# Patient Record
Sex: MALE | Race: WHITE | NOT HISPANIC OR LATINO | Employment: UNEMPLOYED | URBAN - METROPOLITAN AREA
[De-identification: names, ages, dates, MRNs, and addresses within clinical notes are randomized per-mention and may not be internally consistent; named-entity substitution may affect disease eponyms.]

---

## 2017-03-08 ENCOUNTER — HISTORICAL (OUTPATIENT)
Dept: ADMINISTRATIVE | Facility: HOSPITAL | Age: 57
End: 2017-03-08

## 2017-04-12 ENCOUNTER — HISTORICAL (OUTPATIENT)
Dept: ADMINISTRATIVE | Facility: HOSPITAL | Age: 57
End: 2017-04-12

## 2017-09-02 ENCOUNTER — HISTORICAL (OUTPATIENT)
Dept: ADMINISTRATIVE | Facility: HOSPITAL | Age: 57
End: 2017-09-02

## 2018-01-12 ENCOUNTER — HISTORICAL (OUTPATIENT)
Dept: ADMINISTRATIVE | Facility: HOSPITAL | Age: 58
End: 2018-01-12

## 2018-04-04 ENCOUNTER — HISTORICAL (OUTPATIENT)
Dept: INTERNAL MEDICINE | Facility: CLINIC | Age: 58
End: 2018-04-04

## 2020-05-01 ENCOUNTER — HISTORICAL (OUTPATIENT)
Dept: ADMINISTRATIVE | Facility: HOSPITAL | Age: 60
End: 2020-05-01

## 2021-06-09 ENCOUNTER — HISTORICAL (OUTPATIENT)
Dept: ENDOSCOPY | Facility: HOSPITAL | Age: 61
End: 2021-06-09

## 2021-06-09 LAB — CRC RECOMMENDATION EXT: NORMAL

## 2022-02-10 ENCOUNTER — HISTORICAL (OUTPATIENT)
Dept: ADMINISTRATIVE | Facility: HOSPITAL | Age: 62
End: 2022-02-10

## 2022-04-09 ENCOUNTER — HISTORICAL (OUTPATIENT)
Dept: ADMINISTRATIVE | Facility: HOSPITAL | Age: 62
End: 2022-04-09
Payer: MEDICARE

## 2022-04-26 VITALS
SYSTOLIC BLOOD PRESSURE: 125 MMHG | OXYGEN SATURATION: 99 % | HEIGHT: 66 IN | DIASTOLIC BLOOD PRESSURE: 82 MMHG | WEIGHT: 155.88 LBS | BODY MASS INDEX: 25.05 KG/M2

## 2022-04-30 NOTE — H&P
Patient:   Chad Richardson             MRN: [id number]            FIN: [id number]               Age:   61 years     Sex:  Male     :  1960   Associated Diagnoses:   None   Author:   Felix Lee MD A      Chief Complaint   Positive Cologuard test      History of Present Illness   61-year-old male presents for colonoscopy/diagnostic secondary to recent positive Cologuard test.  His last colonoscopy in  was normal.  Today, he describes good appetite tolerating his diet well denies abdominal pain change in bowel habit or unintentional weight loss.      Health Status   Allergies:    Allergies (1) Active Reaction  No Known Allergies None Documented     Current medications:  (Selected)   Inpatient Medications  Ordered  Buffered Lidocaine 1% - 1mL syringe: 0.5 mL, 5 mg =, form: Injection, ID, As Directed PRN for other (see comment), first dose 21 7:55:00 CDT, May inject 0.5mL at IV site, if not allergic  Plasmalyte 1,000 mL: 1,000 mL, 1,000 mL, IV, 20 mL/hr, start date 21 7:55:00 CDT, 1.8, m2  Plasmalyte 1,000 mL: 1,000 mL, 1,000 mL, IV, 20 mL/hr, start date 21 7:55:00 CDT, 1.8, m2  Zofran: 4 mg, form: Injection, IV Push, Once-Unscheduled PRN for nausea, first dose 21 7:55:00 CDT  Prescriptions  Prescribed  Genvoya oral tablet: 1 tab(s), Oral, Daily, with food, # 30 tab(s), 4 Refill(s), Pharmacy: [facility], 168, cm, Height/Length Dosing, 21 13:22:00 CDT, 70.4, kg, Weight Dosing, 21 13:22:00 CDT  atorvastatin 20 mg oral tablet: 20 mg = 1 tab(s), Oral, Daily, # 90 tab(s), 1 Refill(s), Pharmacy: [facility], 168, cm, Height/Length Dosing, 20 8:43:00 CST, 59.35, kg, Weight Dosing, 20 8:43:00 CST  Documented Medications  Documented  Suprep Bowel Prep Kit oral liquid:       Histories   Past Medical History:    Resolved  Tobacco user(  Probable Diagnosis  ) ([id number]):  Resolved on 2015 at 55 years.  Tobacco user  ([id number]):  Resolved on 5/2/2017 at 57 years.  HIV ([id number]):  Resolved on 7/12/2017 at 57 years.   Family History:    Heart disease  Mother  Diabetes mellitus type 2  Mother  Brother  Sister  Hypertension.  Sister     Procedure history:    R femur fx in 1980 at 20 Years.  Leg prosthesis ([id number]).  Comments:  2/12/2015 16:36 CST - Contributor_system, [email or web address]  08/04/2014 10:35:14 - Joi Farias: right lefg  Spleen ([id number]).  Comments:  2/12/2015 16:36 CST - Contributor_system, [email or web address]  08/04/2014 10:36:10 - Joi Farias: sleenectomy   Social History        Social & Psychosocial Habits    Alcohol  12/28/2018  Use: Current    Type: Liquor    Frequency: Daily    Started at age: 14 Years    Has alcohol use interfered with work or home life? No    Do you ever drink more than intended? No    Has anyone been hurt or at risk by your drinking? No    Concerns about alcohol use in household: No    Comment: States his drinking has increased in the last 3 months to at least a 12 pack per day - 12/28/2018 07:24 - Ruthy Esquivel RN    Employment/School  10/02/2015  Status: Unemployed    Home/Environment  10/02/2015  Lives with: Siblings    Alcohol abuse in household: No    Substance abuse in household: No    Smoker in household: Yes    Feels unsafe at home: No    Safe place to go: Yes    Family/Friends available to help: Yes    Nutrition/Health  08/29/2019  Home Diet Regular    Appetite Fair    Sexual  10/02/2015  Sexually active: No    Substance Use  12/28/2018  Use: Past    Type: Cocaine, Heroin, Marijuana    Started at age: 11 Years    IV drug use: No    Tobacco  05/17/2021  Use: 10 or more cigarettes (1/    Patient Wants Consult For Cessation Counseling No    06/08/2021  Use: 10 or more cigarettes (1/    Patient Wants Consult For Cessation Counseling No    06/09/2021  Use: 10 or more cigarettes (1/    Patient Wants Consult For Cessation Counseling No    Abuse/Neglect  05/17/2021  SHX Any  signs of abuse or neglect No    Feels unsafe at home: No    Safe place to go: Yes    06/09/2021  SHX Any signs of abuse or neglect No    Feels unsafe at home: No    Safe place to go: Yes    Spiritual/Cultural  08/29/2019  Gnosticist Preference None  .              Review of Systems   Constitutional:  No fever, No chills, No weakness, No fatigue.    Respiratory:  No shortness of breath, No cough, No wheezing.    Cardiovascular:  No chest pain, No palpitations, No peripheral edema.    Gastrointestinal:  negative except for that mentioned above in hpi.    Genitourinary:  No dysuria, No hematuria.    Hematology/Lymphatics:  Negative.    Endocrine:  Negative.    Musculoskeletal:  No joint pain, No muscle pain, No claudication.    Integumentary:  No rash, No pruritus, No breakdown.    Neurologic:  Alert and oriented X4, No confusion, No headache.       Physical Examination      Vital Signs (last 24 hrs)_____  Last Charted___________  Resp Rate         20 br/min  (JUN 09 07:58)  SBP      125 mmHg  (JUN 09 07:58)  DBP      75 mmHg  (JUN 09 07:58)  SpO2      99 %  (JUN 09 07:58)  Weight      68.94 kg  (JUN 09 07:57)  Height      167.64 cm  (JUN 09 07:57)  BMI      24.53  (JUN 09 07:57)     General:  Alert and oriented, No acute distress.    Eye:  Pupils are equal, round and reactive to light, Normal conjunctiva.    HENT:  Normocephalic, No pharyngeal erythema.    Respiratory:  Lungs are clear to auscultation, Respirations are non-labored, Breath sounds are equal.    Cardiovascular:  Normal rate, Regular rhythm, S1, S2.    Gastrointestinal:  Soft, Non-tender, Non-distended, Normal bowel sounds.    Musculoskeletal:  Normal range of motion, Normal strength, No tenderness, No swelling.    Integumentary:  Warm, Dry, Pink.    Neurologic:  Alert, Oriented, No focal deficits, Cranial Nerves II-XII are grossly intact.       Impression and Plan   Positive Cologuard testingproceed with diagnostic colonoscopy      Professional Services    Thank you for allowing us to participate in this patient's care. Please don't hesitate to call if you have any questions or concerns.

## 2022-05-16 ENCOUNTER — CLINICAL SUPPORT (OUTPATIENT)
Dept: INFECTIOUS DISEASES | Facility: CLINIC | Age: 62
End: 2022-05-16
Payer: MEDICARE

## 2022-05-16 PROCEDURE — 99211 OFF/OP EST MAY X REQ PHY/QHP: CPT | Mod: PBBFAC

## 2022-05-18 ENCOUNTER — TELEPHONE (OUTPATIENT)
Dept: INFECTIOUS DISEASES | Facility: CLINIC | Age: 62
End: 2022-05-18
Payer: MEDICARE

## 2022-05-18 ENCOUNTER — CLINICAL SUPPORT (OUTPATIENT)
Dept: INFECTIOUS DISEASES | Facility: CLINIC | Age: 62
End: 2022-05-18
Payer: MEDICARE

## 2022-05-18 DIAGNOSIS — B20 HIV DISEASE: Primary | ICD-10-CM

## 2022-05-18 DIAGNOSIS — B20 HIV DISEASE: ICD-10-CM

## 2022-05-18 LAB
ALBUMIN SERPL-MCNC: 4 GM/DL (ref 3.4–4.8)
ALBUMIN/GLOB SERPL: 1.1 RATIO (ref 1.1–2)
ALP SERPL-CCNC: 100 UNIT/L (ref 40–150)
ALT SERPL-CCNC: 18 UNIT/L (ref 0–55)
AST SERPL-CCNC: 18 UNIT/L (ref 5–34)
BASOPHILS # BLD AUTO: 0.06 X10(3)/MCL (ref 0–0.2)
BASOPHILS NFR BLD AUTO: 0.9 %
BILIRUBIN DIRECT+TOT PNL SERPL-MCNC: 0.6 MG/DL
BUN SERPL-MCNC: 14.6 MG/DL (ref 8.4–25.7)
CALCIUM SERPL-MCNC: 9.3 MG/DL (ref 8.8–10)
CHLORIDE SERPL-SCNC: 107 MMOL/L (ref 98–107)
CO2 SERPL-SCNC: 28 MMOL/L (ref 23–31)
CREAT SERPL-MCNC: 0.89 MG/DL (ref 0.73–1.18)
EOSINOPHIL # BLD AUTO: 0.26 X10(3)/MCL (ref 0–0.9)
EOSINOPHIL NFR BLD AUTO: 3.8 %
ERYTHROCYTE [DISTWIDTH] IN BLOOD BY AUTOMATED COUNT: 14.6 % (ref 11.5–17)
GLOBULIN SER-MCNC: 3.5 GM/DL (ref 2.4–3.5)
GLUCOSE SERPL-MCNC: 102 MG/DL (ref 82–115)
HCT VFR BLD AUTO: 48.4 % (ref 42–52)
HGB BLD-MCNC: 15.5 GM/DL (ref 14–18)
IMM GRANULOCYTES # BLD AUTO: 0.01 X10(3)/MCL (ref 0–0.02)
IMM GRANULOCYTES NFR BLD AUTO: 0.1 % (ref 0–0.43)
LYMPHOCYTES # BLD AUTO: 3.21 X10(3)/MCL (ref 0.6–4.6)
LYMPHOCYTES NFR BLD AUTO: 46.3 %
MCH RBC QN AUTO: 29.3 PG (ref 27–31)
MCHC RBC AUTO-ENTMCNC: 32 MG/DL (ref 33–36)
MCV RBC AUTO: 91.5 FL (ref 80–94)
MONOCYTES # BLD AUTO: 0.56 X10(3)/MCL (ref 0.1–1.3)
MONOCYTES NFR BLD AUTO: 8.1 %
NEUTROPHILS # BLD AUTO: 2.8 X10(3)/MCL (ref 2.1–9.2)
NEUTROPHILS NFR BLD AUTO: 40.8 %
NRBC BLD AUTO-RTO: 0 %
PLATELET # BLD AUTO: 449 X10(3)/MCL (ref 130–400)
PMV BLD AUTO: 9.7 FL (ref 9.4–12.4)
POTASSIUM SERPL-SCNC: 4.3 MMOL/L (ref 3.5–5.1)
PROT SERPL-MCNC: 7.5 GM/DL (ref 5.8–7.6)
RBC # BLD AUTO: 5.29 X10(6)/MCL (ref 4.7–6.1)
SODIUM SERPL-SCNC: 141 MMOL/L (ref 136–145)
WBC # SPEC AUTO: 6.9 X10(3)/MCL (ref 4.5–11.5)

## 2022-05-18 PROCEDURE — 99211 OFF/OP EST MAY X REQ PHY/QHP: CPT | Mod: PBBFAC

## 2022-05-18 PROCEDURE — 85025 COMPLETE CBC W/AUTO DIFF WBC: CPT

## 2022-05-18 PROCEDURE — 36415 COLL VENOUS BLD VENIPUNCTURE: CPT

## 2022-05-18 PROCEDURE — 86361 T CELL ABSOLUTE COUNT: CPT

## 2022-05-18 PROCEDURE — 80053 COMPREHEN METABOLIC PANEL: CPT

## 2022-05-18 PROCEDURE — 87536 HIV-1 QUANT&REVRSE TRNSCRPJ: CPT | Performed by: NURSE PRACTITIONER

## 2022-05-21 LAB — HIV1 RNA # PLAS NAA DL=20: NORMAL COPIES/ML

## 2022-05-22 LAB
AGE: >18
CD3+CD4+ CELLS # BLD: 46.3 % (ref 28–48)
CD3+CD4+ CELLS # SPEC: 990.36 UNIT/L (ref 589–1505)
CD3+CD4+ CELLS NFR BLD: 31 %
LYMPHOCYTES # BLD AUTO: 3194.7 X10(3)/MCL (ref 1260–5520)
LYMPHOMA - T-CELL MARKERS SPEC-IMP: NORMAL
WBC # BLD AUTO: 6900 /MM3 (ref 4500–11500)

## 2022-05-24 ENCOUNTER — OFFICE VISIT (OUTPATIENT)
Dept: INFECTIOUS DISEASES | Facility: CLINIC | Age: 62
End: 2022-05-24
Payer: MEDICARE

## 2022-05-24 VITALS
HEIGHT: 66 IN | RESPIRATION RATE: 18 BRPM | DIASTOLIC BLOOD PRESSURE: 85 MMHG | TEMPERATURE: 98 F | HEART RATE: 86 BPM | BODY MASS INDEX: 24.85 KG/M2 | SYSTOLIC BLOOD PRESSURE: 136 MMHG | WEIGHT: 154.63 LBS

## 2022-05-24 DIAGNOSIS — B20 HIV DISEASE: Primary | ICD-10-CM

## 2022-05-24 DIAGNOSIS — E78.00 PURE HYPERCHOLESTEROLEMIA: ICD-10-CM

## 2022-05-24 DIAGNOSIS — Z72.0 TOBACCO ABUSE: ICD-10-CM

## 2022-05-24 PROCEDURE — 99214 PR OFFICE/OUTPT VISIT, EST, LEVL IV, 30-39 MIN: ICD-10-PCS | Mod: S$PBB,,, | Performed by: NURSE PRACTITIONER

## 2022-05-24 PROCEDURE — 99214 OFFICE O/P EST MOD 30 MIN: CPT | Mod: S$PBB,,, | Performed by: NURSE PRACTITIONER

## 2022-05-24 PROCEDURE — 99214 OFFICE O/P EST MOD 30 MIN: CPT | Mod: PBBFAC | Performed by: NURSE PRACTITIONER

## 2022-05-24 RX ORDER — ELVITEGRAVIR, COBICISTAT, EMTRICITABINE, AND TENOFOVIR ALAFENAMIDE 150; 150; 200; 10 MG/1; MG/1; MG/1; MG/1
1 TABLET ORAL DAILY
Qty: 30 TABLET | Refills: 4 | Status: SHIPPED | OUTPATIENT
Start: 2022-05-24 | End: 2022-09-26 | Stop reason: SDUPTHER

## 2022-05-24 RX ORDER — ELVITEGRAVIR, COBICISTAT, EMTRICITABINE, AND TENOFOVIR ALAFENAMIDE 150; 150; 200; 10 MG/1; MG/1; MG/1; MG/1
TABLET ORAL
COMMUNITY
Start: 2022-04-19 | End: 2022-05-24 | Stop reason: SDUPTHER

## 2022-05-24 RX ORDER — ATORVASTATIN CALCIUM 20 MG/1
20 TABLET, FILM COATED ORAL DAILY
Qty: 30 TABLET | Refills: 6 | Status: SHIPPED | OUTPATIENT
Start: 2022-05-24 | End: 2022-09-26 | Stop reason: SDUPTHER

## 2022-05-24 RX ORDER — ATORVASTATIN CALCIUM 20 MG/1
20 TABLET, FILM COATED ORAL DAILY
COMMUNITY
Start: 2022-03-23 | End: 2022-05-24 | Stop reason: SDUPTHER

## 2022-05-24 NOTE — PROGRESS NOTES
Subjective:       Patient ID: Chad Richardson is a 62 y.o. male.    Chief Complaint: b20 f/u     5/24/22  Chad is a 63 yo WM presenting today for HIV f/u visit.  He reports 100% adherent to Genvoya without any noted side effects.  Last labs 5/18/22 VL UD, CD4 990.  He is taking atorvastatin daily for HLD, controlled.  He tells me that he keeps rescheduling appts with Dr. Kim for PCP visits, agrees to attend.  He tells me that he is not ready to quit smoking at this juncture.  Doing okay overall, all questions answered & concerns addressed.     1/24/22  Chad is a 60 yo WM presenting today for HIV f/u visit.  He tells me that he has been doing much better with Genvoya, taking it every day now.  Last labs 9/22/21 , CD4 1361.  He tells me that he is not fasting this am, had a small bowl of ice cream about 3 hours ago.  Will check non-fasting lipids today.  Denies any new sexual partners.  All vaccinations UTD with the exception of COVID vaccine, which he declines.  He tells me that he was scheduled for routine visit with PCP last week, but Dr. Kim was ill & appointment was rescheduled.  All questions answered & concerns addressed.     9/22/21  Chad is a 60 yo WM presenting today for HIV f/u visit.  He reports 100% adherent to Genvoya, tolerates well with viral suppression.  Last labs 5/2020 VL 40, CD4 1488. Will repeat labs today.  He is still taking atorvastatin daily as well.  Remains in care with PCP Dr. Kim.   Colonoscopy completed 6/9/21, NL.  He declines COVID vaccination.  Discussed recommendations for monoclonal antibody infusion if he should contract COVID, voiced understanding.  Not ready to quit smoking.  No concerns voiced today.    Review of Systems   Constitutional: Negative.    HENT: Negative.    Respiratory: Negative.    Cardiovascular: Negative.    Gastrointestinal: Negative.    Genitourinary: Negative.    Integumentary:  Negative.   Neurological: Negative.    Hematological:  Negative.    Psychiatric/Behavioral: Negative.          Objective:      Physical Exam  Vitals reviewed.   Constitutional:       General: He is not in acute distress.     Appearance: Normal appearance. He is not toxic-appearing.   HENT:      Mouth/Throat:      Mouth: Mucous membranes are moist.      Pharynx: Oropharynx is clear.   Eyes:      Conjunctiva/sclera: Conjunctivae normal.   Cardiovascular:      Rate and Rhythm: Normal rate and regular rhythm.   Pulmonary:      Effort: Pulmonary effort is normal. No respiratory distress.      Breath sounds: Normal breath sounds.   Abdominal:      General: Abdomen is flat. Bowel sounds are normal.      Palpations: Abdomen is soft.   Musculoskeletal:         General: Normal range of motion.      Cervical back: Normal range of motion.   Lymphadenopathy:      Cervical: No cervical adenopathy.   Skin:     General: Skin is warm and dry.   Neurological:      General: No focal deficit present.      Mental Status: He is alert and oriented to person, place, and time. Mental status is at baseline.   Psychiatric:         Mood and Affect: Mood normal.         Behavior: Behavior normal.         Assessment:       Problem List Items Addressed This Visit        Cardiac/Vascular    Pure hypercholesterolemia    Relevant Medications    atorvastatin (LIPITOR) 20 MG tablet       ID    HIV disease - Primary    Relevant Medications    GENVOYA [id number] mg Tab    Other Relevant Orders    CD4 Lymphocytes    CBC Auto Differential    Comprehensive Metabolic Panel    HIV-1 RNA, Quantitative, PCR with Reflex to Genotype       Other    Tobacco abuse          Plan:       HIV disease  -     GENVOYA [phone] mg Tab; Take 1 tablet by mouth once daily.  Dispense: 30 tablet; Refill: 4  -     CD4 Lymphocytes; Future; Expected date: 09/24/2022  -     CBC Auto Differential; Future; Expected date: 09/24/2022  -     Comprehensive Metabolic Panel; Future; Expected date: 09/24/2022  -     HIV-1 RNA,  Quantitative, PCR with Reflex to Genotype; Future; Expected date: 09/24/2022    Adherence and sexual health counseling done.  Use condoms for all sexual encounters.  Blood precautions.   Continue Genvoya as directed.  Labs prior to next visit.  RTC 4 months with Ashley.    HIV Wellness:  Anal pap: 2019 NIL   Oral CT/GC: 2019 neg  Anal CT/GC: 2019 neg  Urine CT/GC: 1/22 neg  RPR: 1/22 NR  Ophth:  4/21  DEXA: 3/17 WNL  Colonoscopy: 7/2014 at [facility], 6/9/21 Dr. Lee, repeat in 5 yrs      Pure hypercholesterolemia  -     atorvastatin (LIPITOR) 20 MG tablet; Take 1 tablet (20 mg total) by mouth once daily.  Dispense: 30 tablet; Refill: 6    Decrease intake of fried & greasy foods.    Increase intake of Omega 3 rich foods in diet such as fatty fish, nuts, avocado, etc.  Avoid trans fat in diet, commonly found in packaged foods such as snacks/cakes/cookies.  Increase fiber intake.   Increase exercise to at least 30 minutes of moderate activity 3-5 days per week.  Continue atorvastatin as prescribed.    Tobacco abuse  Smoking cessation encouraged.  Pt is not ready to quit.   Discussed benefits of quitting to include but not limited to improved overall health, decreased cardiac/vascular/pulmonary/stroke risks, as well as financial benefits.

## 2022-09-26 ENCOUNTER — OFFICE VISIT (OUTPATIENT)
Dept: INFECTIOUS DISEASES | Facility: CLINIC | Age: 62
End: 2022-09-26
Payer: MEDICARE

## 2022-09-26 VITALS
DIASTOLIC BLOOD PRESSURE: 77 MMHG | BODY MASS INDEX: 25.26 KG/M2 | HEART RATE: 75 BPM | SYSTOLIC BLOOD PRESSURE: 125 MMHG | RESPIRATION RATE: 16 BRPM | WEIGHT: 157.19 LBS | TEMPERATURE: 98 F | HEIGHT: 66 IN

## 2022-09-26 DIAGNOSIS — Z13.820 SPECIAL SCREENING FOR OSTEOPOROSIS: ICD-10-CM

## 2022-09-26 DIAGNOSIS — E78.00 PURE HYPERCHOLESTEROLEMIA: ICD-10-CM

## 2022-09-26 DIAGNOSIS — B20 HIV DISEASE: Primary | ICD-10-CM

## 2022-09-26 DIAGNOSIS — Z79.899 LONG-TERM USE OF HIGH-RISK MEDICATION: ICD-10-CM

## 2022-09-26 DIAGNOSIS — Z23 NEED FOR VACCINATION: ICD-10-CM

## 2022-09-26 DIAGNOSIS — E78.5 HYPERLIPIDEMIA, UNSPECIFIED HYPERLIPIDEMIA TYPE: ICD-10-CM

## 2022-09-26 LAB
ALBUMIN SERPL-MCNC: 4 GM/DL (ref 3.4–4.8)
ALBUMIN/GLOB SERPL: 1.2 RATIO (ref 1.1–2)
ALP SERPL-CCNC: 83 UNIT/L (ref 40–150)
ALT SERPL-CCNC: 18 UNIT/L (ref 0–55)
AST SERPL-CCNC: 18 UNIT/L (ref 5–34)
BASOPHILS # BLD AUTO: 0.05 X10(3)/MCL (ref 0–0.2)
BASOPHILS NFR BLD AUTO: 0.8 %
BILIRUBIN DIRECT+TOT PNL SERPL-MCNC: 0.5 MG/DL
BUN SERPL-MCNC: 15.9 MG/DL (ref 8.4–25.7)
CALCIUM SERPL-MCNC: 9.3 MG/DL (ref 8.8–10)
CHLORIDE SERPL-SCNC: 105 MMOL/L (ref 98–107)
CO2 SERPL-SCNC: 24 MMOL/L (ref 23–31)
CREAT SERPL-MCNC: 0.8 MG/DL (ref 0.73–1.18)
EOSINOPHIL # BLD AUTO: 0.26 X10(3)/MCL (ref 0–0.9)
EOSINOPHIL NFR BLD AUTO: 4 %
ERYTHROCYTE [DISTWIDTH] IN BLOOD BY AUTOMATED COUNT: 14.6 % (ref 11.5–17)
GFR SERPLBLD CREATININE-BSD FMLA CKD-EPI: >60 MLS/MIN/1.73/M2
GLOBULIN SER-MCNC: 3.3 GM/DL (ref 2.4–3.5)
GLUCOSE SERPL-MCNC: 56 MG/DL (ref 82–115)
HCT VFR BLD AUTO: 44.1 % (ref 42–52)
HGB BLD-MCNC: 14 GM/DL (ref 14–18)
IMM GRANULOCYTES # BLD AUTO: 0.02 X10(3)/MCL (ref 0–0.04)
IMM GRANULOCYTES NFR BLD AUTO: 0.3 %
LYMPHOCYTES # BLD AUTO: 3.09 X10(3)/MCL (ref 0.6–4.6)
LYMPHOCYTES NFR BLD AUTO: 47.9 %
MCH RBC QN AUTO: 29.6 PG (ref 27–31)
MCHC RBC AUTO-ENTMCNC: 31.7 MG/DL (ref 33–36)
MCV RBC AUTO: 93.2 FL (ref 80–94)
MONOCYTES # BLD AUTO: 0.68 X10(3)/MCL (ref 0.1–1.3)
MONOCYTES NFR BLD AUTO: 10.5 %
NEUTROPHILS # BLD AUTO: 2.4 X10(3)/MCL (ref 2.1–9.2)
NEUTROPHILS NFR BLD AUTO: 36.5 %
NRBC BLD AUTO-RTO: 0 %
PLATELET # BLD AUTO: 367 X10(3)/MCL (ref 130–400)
PMV BLD AUTO: 10 FL (ref 7.4–10.4)
POTASSIUM SERPL-SCNC: 4.1 MMOL/L (ref 3.5–5.1)
PROT SERPL-MCNC: 7.3 GM/DL (ref 5.8–7.6)
RBC # BLD AUTO: 4.73 X10(6)/MCL (ref 4.7–6.1)
SODIUM SERPL-SCNC: 139 MMOL/L (ref 136–145)
T PALLIDUM AB SER QL: NONREACTIVE
WBC # SPEC AUTO: 6.5 X10(3)/MCL (ref 4.5–11.5)

## 2022-09-26 PROCEDURE — 86361 T CELL ABSOLUTE COUNT: CPT | Performed by: NURSE PRACTITIONER

## 2022-09-26 PROCEDURE — 99213 OFFICE O/P EST LOW 20 MIN: CPT | Mod: PBBFAC | Performed by: NURSE PRACTITIONER

## 2022-09-26 PROCEDURE — G0008 ADMIN INFLUENZA VIRUS VAC: HCPCS | Mod: PBBFAC

## 2022-09-26 PROCEDURE — 87536 HIV-1 QUANT&REVRSE TRNSCRPJ: CPT | Performed by: NURSE PRACTITIONER

## 2022-09-26 PROCEDURE — 99214 OFFICE O/P EST MOD 30 MIN: CPT | Mod: S$PBB,,, | Performed by: NURSE PRACTITIONER

## 2022-09-26 PROCEDURE — 86780 TREPONEMA PALLIDUM: CPT | Performed by: NURSE PRACTITIONER

## 2022-09-26 PROCEDURE — 99214 PR OFFICE/OUTPT VISIT, EST, LEVL IV, 30-39 MIN: ICD-10-PCS | Mod: S$PBB,,, | Performed by: NURSE PRACTITIONER

## 2022-09-26 PROCEDURE — 36415 COLL VENOUS BLD VENIPUNCTURE: CPT | Performed by: NURSE PRACTITIONER

## 2022-09-26 PROCEDURE — 85025 COMPLETE CBC W/AUTO DIFF WBC: CPT | Performed by: NURSE PRACTITIONER

## 2022-09-26 PROCEDURE — 80053 COMPREHEN METABOLIC PANEL: CPT | Performed by: NURSE PRACTITIONER

## 2022-09-26 RX ORDER — ELVITEGRAVIR, COBICISTAT, EMTRICITABINE, AND TENOFOVIR ALAFENAMIDE 150; 150; 200; 10 MG/1; MG/1; MG/1; MG/1
1 TABLET ORAL DAILY
Qty: 30 TABLET | Refills: 4 | Status: SHIPPED | OUTPATIENT
Start: 2022-09-26 | End: 2023-01-11 | Stop reason: SDUPTHER

## 2022-09-26 RX ORDER — ATORVASTATIN CALCIUM 20 MG/1
20 TABLET, FILM COATED ORAL DAILY
Qty: 30 TABLET | Refills: 6 | Status: SHIPPED | OUTPATIENT
Start: 2022-09-26 | End: 2023-03-30

## 2022-09-26 NOTE — PROGRESS NOTES
Subjective:       Patient ID: Chad Richardson is a 62 y.o. male.    Chief Complaint: Follow-up (B20)    9/26/22  Chad is a 63 yo WM presenting today for HIV f/u visit.  He takes Genvoya daily but did not have a dose yesterday or today due to delay in delivery from pharmacy.  He has contacted pharmacy & is expecting delivery today.  Tolerates well, last labs 5/22 VL UD, CD4 990.  Will update labs today.  Appreciates flu vax today.  He continues with atorvastatin daily for HLD.  Due for f/u visit with Dr. RANCHO Kim, PCP. Will call for annual visit.  He tells me that he has quit smoking since last visit.  Efforts applauded.  All questions answered & concerns addressed.     5/24/22  Chad is a 63 yo WM presenting today for HIV f/u visit.  He reports 100% adherent to Genvoya without any noted side effects.  Last labs 5/18/22 VL UD, CD4 990.  He is taking atorvastatin daily for HLD, controlled.  He tells me that he keeps rescheduling appts with Dr. Kim for PCP visits, agrees to attend.  He tells me that he is not ready to quit smoking at this juncture.  Doing okay overall, all questions answered & concerns addressed.      1/24/22  Chad is a 62 yo WM presenting today for HIV f/u visit.  He tells me that he has been doing much better with Genvoya, taking it every day now.  Last labs 9/22/21 , CD4 1361.  He tells me that he is not fasting this am, had a small bowl of ice cream about 3 hours ago.  Will check non-fasting lipids today.  Denies any new sexual partners.  All vaccinations UTD with the exception of COVID vaccine, which he declines.  He tells me that he was scheduled for routine visit with PCP last week, but Dr. Kim was ill & appointment was rescheduled.  All questions answered & concerns addressed.    Review of Systems   Constitutional: Negative.    HENT: Negative.     Respiratory: Negative.     Cardiovascular: Negative.    Gastrointestinal: Negative.    Genitourinary: Negative.     Integumentary:  Negative.   Neurological: Negative.    Hematological: Negative.    Psychiatric/Behavioral: Negative.         Objective:      Physical Exam  Vitals reviewed.   Constitutional:       General: He is not in acute distress.     Appearance: Normal appearance. He is not toxic-appearing.   HENT:      Mouth/Throat:      Mouth: Mucous membranes are moist.      Pharynx: Oropharynx is clear.   Eyes:      Conjunctiva/sclera: Conjunctivae normal.   Cardiovascular:      Rate and Rhythm: Normal rate and regular rhythm.   Pulmonary:      Effort: Pulmonary effort is normal. No respiratory distress.      Breath sounds: Normal breath sounds.   Abdominal:      General: Abdomen is flat. Bowel sounds are normal.      Palpations: Abdomen is soft.   Musculoskeletal:         General: Normal range of motion.      Cervical back: Normal range of motion.   Lymphadenopathy:      Cervical: No cervical adenopathy.   Skin:     General: Skin is warm and dry.   Neurological:      General: No focal deficit present.      Mental Status: He is alert and oriented to person, place, and time. Mental status is at baseline.   Psychiatric:         Mood and Affect: Mood normal.         Behavior: Behavior normal.       Assessment:       Problem List Items Addressed This Visit          Cardiac/Vascular    Pure hypercholesterolemia    Relevant Medications    atorvastatin (LIPITOR) 20 MG tablet       ID    HIV disease - Primary    Relevant Medications    GENVOYA [id number] mg Tab    Other Relevant Orders    CD4 Lymphocytes    CBC Auto Differential    Comprehensive Metabolic Panel    HIV-1 RNA, Quantitative, PCR with Reflex to Genotype    SYPHILIS ANTIBODY (WITH REFLEX RPR)     Other Visit Diagnoses       Hyperlipidemia, unspecified hyperlipidemia type        Need for vaccination        Relevant Orders    Influenza - Quadrivalent (PF)              Plan:         HIV disease  -     CD4 Lymphocytes; Future; Expected date: 09/26/2022  -     CBC Auto  Differential; Future; Expected date: 09/26/2022  -     Comprehensive Metabolic Panel  -     HIV-1 RNA, Quantitative, PCR with Reflex to Genotype; Future; Expected date: 09/26/2022  -     SYPHILIS ANTIBODY (WITH REFLEX RPR); Future; Expected date: 09/26/2022  -     GENVOYA [phone] mg Tab; Take 1 tablet by mouth once daily.  Dispense: 30 tablet; Refill: 4  Adherence and sexual health counseling done.  Use condoms for all sexual encounters.  Blood precautions.   Continue Genvoya as directed.  Labs today.  RTC 3 months with Ashley.     HIV Wellness:  Anal pap: 2019 NIL, no history of anal intercourse.   Oral CT/GC: 2019 neg  Anal CT/GC: 2019 neg  Urine CT/GC: 1/22 neg  RPR: 1/22 NR, 9/22  Ophth:  4/21  DEXA: 3/17 WNL, not covered by insurance 9/22  Colonoscopy: 7/2014 at [facility], 6/9/21 Dr. Lee, repeat in 5 yrs     Hyperlipidemia, unspecified hyperlipidemia type  Decrease intake of fried & greasy foods.    Increase intake of Omega 3 rich foods in diet such as fatty fish, nuts, avocado, etc.  Avoid trans fat in diet, commonly found in packaged foods such as snacks/cakes/cookies.  Increase fiber intake.   Increase exercise to at least 30 minutes of moderate activity 3-5 days per week.  Continue atorvastatin as prescribed.     Need for vaccination  -     Influenza - Quadrivalent (PF)  Flu vax today     Pure hypercholesterolemia  -     atorvastatin (LIPITOR) 20 MG tablet; Take 1 tablet (20 mg total) by mouth once daily.  Dispense: 30 tablet; Refill: 6    Long-term use of high-risk medication  DEXA ordered, denied by insurance    Special screening for osteoporosis  DEXA ordered, denied by insurance

## 2022-09-27 LAB
AGE: 64
CD3+CD4+ CELLS # BLD: 47.9 % (ref 28–48)
CD3+CD4+ CELLS # SPEC: 937.16 UNIT/L (ref 589–1505)
CD3+CD4+ CELLS NFR BLD: 30.1 %
LYMPHOCYTES # BLD AUTO: 3113.5 X10(3)/MCL (ref 1260–5520)
LYMPHOMA - T-CELL MARKERS SPEC-IMP: NORMAL
WBC # BLD AUTO: 6500 /MM3 (ref 4500–11500)

## 2022-09-29 LAB — HIV1 RNA # PLAS NAA DL=20: NORMAL COPIES/ML

## 2022-12-10 ENCOUNTER — DOCUMENTATION ONLY (OUTPATIENT)
Dept: INTERNAL MEDICINE | Facility: CLINIC | Age: 62
End: 2022-12-10
Payer: MEDICARE

## 2022-12-27 ENCOUNTER — LAB VISIT (OUTPATIENT)
Dept: LAB | Facility: HOSPITAL | Age: 62
End: 2022-12-27
Attending: NURSE PRACTITIONER
Payer: MEDICARE

## 2022-12-27 ENCOUNTER — OFFICE VISIT (OUTPATIENT)
Dept: INFECTIOUS DISEASES | Facility: CLINIC | Age: 62
End: 2022-12-27
Payer: MEDICARE

## 2022-12-27 VITALS
BODY MASS INDEX: 25.85 KG/M2 | SYSTOLIC BLOOD PRESSURE: 137 MMHG | TEMPERATURE: 98 F | WEIGHT: 160.88 LBS | HEART RATE: 79 BPM | DIASTOLIC BLOOD PRESSURE: 79 MMHG | RESPIRATION RATE: 16 BRPM | HEIGHT: 66 IN

## 2022-12-27 DIAGNOSIS — E78.5 HYPERLIPIDEMIA, UNSPECIFIED HYPERLIPIDEMIA TYPE: ICD-10-CM

## 2022-12-27 DIAGNOSIS — E16.1 OTHER HYPOGLYCEMIA: ICD-10-CM

## 2022-12-27 DIAGNOSIS — E55.9 VITAMIN D DEFICIENCY, UNSPECIFIED: ICD-10-CM

## 2022-12-27 DIAGNOSIS — B20 HIV DISEASE: ICD-10-CM

## 2022-12-27 DIAGNOSIS — B20 HIV DISEASE: Primary | ICD-10-CM

## 2022-12-27 LAB
ALBUMIN SERPL-MCNC: 4.2 G/DL (ref 3.4–4.8)
ALBUMIN/GLOB SERPL: 1.2 RATIO (ref 1.1–2)
ALP SERPL-CCNC: 113 UNIT/L (ref 40–150)
ALT SERPL-CCNC: 23 UNIT/L (ref 0–55)
APPEARANCE UR: CLEAR
AST SERPL-CCNC: 21 UNIT/L (ref 5–34)
BACTERIA #/AREA URNS AUTO: ABNORMAL /HPF
BASOPHILS # BLD AUTO: 0.06 X10(3)/MCL (ref 0–0.2)
BASOPHILS NFR BLD AUTO: 0.9 %
BILIRUB UR QL STRIP.AUTO: NEGATIVE MG/DL
BILIRUBIN DIRECT+TOT PNL SERPL-MCNC: 0.5 MG/DL
BUN SERPL-MCNC: 15.8 MG/DL (ref 8.4–25.7)
C TRACH DNA SPEC QL NAA+PROBE: NOT DETECTED
CALCIUM SERPL-MCNC: 9.7 MG/DL (ref 8.8–10)
CHLORIDE SERPL-SCNC: 105 MMOL/L (ref 98–107)
CHOLEST SERPL-MCNC: 183 MG/DL
CHOLEST/HDLC SERPL: 3 {RATIO} (ref 0–5)
CO2 SERPL-SCNC: 28 MMOL/L (ref 23–31)
COLOR UR AUTO: ABNORMAL
CREAT SERPL-MCNC: 0.85 MG/DL (ref 0.73–1.18)
DEPRECATED CALCIDIOL+CALCIFEROL SERPL-MC: 36.6 NG/ML (ref 30–80)
EOSINOPHIL # BLD AUTO: 0.18 X10(3)/MCL (ref 0–0.9)
EOSINOPHIL NFR BLD AUTO: 2.7 %
ERYTHROCYTE [DISTWIDTH] IN BLOOD BY AUTOMATED COUNT: 14.1 % (ref 11.6–14.4)
EST. AVERAGE GLUCOSE BLD GHB EST-MCNC: 116.9 MG/DL
GFR SERPLBLD CREATININE-BSD FMLA CKD-EPI: >60 MLS/MIN/1.73/M2
GLOBULIN SER-MCNC: 3.6 GM/DL (ref 2.4–3.5)
GLUCOSE SERPL-MCNC: 103 MG/DL (ref 82–115)
GLUCOSE UR QL STRIP.AUTO: NORMAL MG/DL
HBA1C MFR BLD: 5.7 %
HCT VFR BLD AUTO: 45.5 % (ref 42–52)
HCV AB SERPL QL IA: NONREACTIVE
HDLC SERPL-MCNC: 57 MG/DL (ref 35–60)
HGB BLD-MCNC: 14.6 GM/DL (ref 14–18)
HYALINE CASTS #/AREA URNS LPF: ABNORMAL /LPF
IMM GRANULOCYTES # BLD AUTO: 0.01 X10(3)/MCL (ref 0–0.04)
IMM GRANULOCYTES NFR BLD AUTO: 0.2 %
KETONES UR QL STRIP.AUTO: NEGATIVE MG/DL
LDLC SERPL CALC-MCNC: 109 MG/DL (ref 50–140)
LEUKOCYTE ESTERASE UR QL STRIP.AUTO: 25 UNIT/L
LYMPHOCYTES # BLD AUTO: 3.19 X10(3)/MCL (ref 0.6–4.6)
LYMPHOCYTES NFR BLD AUTO: 48.3 %
MCH RBC QN AUTO: 29.1 PG
MCHC RBC AUTO-ENTMCNC: 32.1 MG/DL (ref 33–36)
MCV RBC AUTO: 90.8 FL (ref 80–94)
MONOCYTES # BLD AUTO: 0.69 X10(3)/MCL (ref 0.1–1.3)
MONOCYTES NFR BLD AUTO: 10.4 %
MUCOUS THREADS URNS QL MICRO: ABNORMAL /LPF
N GONORRHOEA DNA SPEC QL NAA+PROBE: NOT DETECTED
NEUTROPHILS # BLD AUTO: 2.48 X10(3)/MCL (ref 2.1–9.2)
NEUTROPHILS NFR BLD AUTO: 37.5 %
NITRITE UR QL STRIP.AUTO: ABNORMAL
NRBC BLD AUTO-RTO: 0 % (ref 0–1)
PH UR STRIP.AUTO: 5 [PH]
PLATELET # BLD AUTO: 471 X10(3)/MCL (ref 140–371)
PMV BLD AUTO: 9.2 FL (ref 9.4–12.4)
POTASSIUM SERPL-SCNC: 4.4 MMOL/L (ref 3.5–5.1)
PROT SERPL-MCNC: 7.8 GM/DL (ref 5.8–7.6)
PROT UR QL STRIP.AUTO: NEGATIVE MG/DL
RBC # BLD AUTO: 5.01 X10(6)/MCL (ref 4.7–6.1)
RBC #/AREA URNS AUTO: ABNORMAL /HPF
RBC UR QL AUTO: NEGATIVE UNIT/L
SODIUM SERPL-SCNC: 140 MMOL/L (ref 136–145)
SP GR UR STRIP.AUTO: 1.01
SQUAMOUS #/AREA URNS LPF: ABNORMAL /HPF
T PALLIDUM AB SER QL: NONREACTIVE
TRIGL SERPL-MCNC: 85 MG/DL (ref 34–140)
TSH SERPL-ACNC: 2.13 UIU/ML (ref 0.35–4.94)
UROBILINOGEN UR STRIP-ACNC: NORMAL MG/DL
VLDLC SERPL CALC-MCNC: 17 MG/DL
WBC # SPEC AUTO: 6.6 X10(3)/MCL (ref 4.5–11.5)
WBC #/AREA URNS AUTO: ABNORMAL /HPF

## 2022-12-27 PROCEDURE — 85025 COMPLETE CBC W/AUTO DIFF WBC: CPT

## 2022-12-27 PROCEDURE — 86480 TB TEST CELL IMMUN MEASURE: CPT

## 2022-12-27 PROCEDURE — 80053 COMPREHEN METABOLIC PANEL: CPT

## 2022-12-27 PROCEDURE — 36415 COLL VENOUS BLD VENIPUNCTURE: CPT

## 2022-12-27 PROCEDURE — 86360 T CELL ABSOLUTE COUNT/RATIO: CPT

## 2022-12-27 PROCEDURE — 80061 LIPID PANEL: CPT

## 2022-12-27 PROCEDURE — 82306 VITAMIN D 25 HYDROXY: CPT

## 2022-12-27 PROCEDURE — 99214 OFFICE O/P EST MOD 30 MIN: CPT | Mod: S$PBB,,, | Performed by: NURSE PRACTITIONER

## 2022-12-27 PROCEDURE — 86361 T CELL ABSOLUTE COUNT: CPT

## 2022-12-27 PROCEDURE — 86803 HEPATITIS C AB TEST: CPT

## 2022-12-27 PROCEDURE — 81001 URINALYSIS AUTO W/SCOPE: CPT

## 2022-12-27 PROCEDURE — 87088 URINE BACTERIA CULTURE: CPT

## 2022-12-27 PROCEDURE — 99214 OFFICE O/P EST MOD 30 MIN: CPT | Mod: PBBFAC | Performed by: NURSE PRACTITIONER

## 2022-12-27 PROCEDURE — 84443 ASSAY THYROID STIM HORMONE: CPT

## 2022-12-27 PROCEDURE — 83036 HEMOGLOBIN GLYCOSYLATED A1C: CPT

## 2022-12-27 PROCEDURE — 87536 HIV-1 QUANT&REVRSE TRNSCRPJ: CPT

## 2022-12-27 PROCEDURE — 86780 TREPONEMA PALLIDUM: CPT

## 2022-12-27 PROCEDURE — 87591 N.GONORRHOEAE DNA AMP PROB: CPT

## 2022-12-27 PROCEDURE — 87491 CHLMYD TRACH DNA AMP PROBE: CPT

## 2022-12-27 PROCEDURE — 99214 PR OFFICE/OUTPT VISIT, EST, LEVL IV, 30-39 MIN: ICD-10-PCS | Mod: S$PBB,,, | Performed by: NURSE PRACTITIONER

## 2022-12-27 NOTE — PROGRESS NOTES
Subjective:       Patient ID: Chad Richardson is a 62 y.o. male.    Chief Complaint: Follow-up (HIV)    12/27/22  Chad is a 61 yo WM here today for HIV f/u visit.  He is taking Genvoya daily & tolerates it well without any noted side effects.  Labs 9/26/22 VL UD, CD4 937.  Renal function stable.  He remains smoke free.  Efforts applauded. Lipids improved with atorvastatin.  He is not sexually active.  DEXA not covered by insurance.  Pt tells me that he agrees to pay for screening if not covered.  Order placed. Has no concerns or questions today.      9/26/22  Chad is a 61 yo WM presenting today for HIV f/u visit.  He takes Genvoya daily but did not have a dose yesterday or today due to delay in delivery from pharmacy.  He has contacted pharmacy & is expecting delivery today.  Tolerates well, last labs 5/22 VL UD, CD4 990.  Will update labs today.  Appreciates flu vax today.  He continues with atorvastatin daily for HLD.  Due for f/u visit with Dr. RANCHO Kim, PCP. Will call for annual visit.  He tells me that he has quit smoking since last visit.  Efforts applauded.  All questions answered & concerns addressed.      5/24/22  Chad is a 61 yo WM presenting today for HIV f/u visit.  He reports 100% adherent to Genvoya without any noted side effects.  Last labs 5/18/22 VL UD, CD4 990.  He is taking atorvastatin daily for HLD, controlled.  He tells me that he keeps rescheduling appts with Dr. Kim for PCP visits, agrees to attend.  He tells me that he is not ready to quit smoking at this juncture.  Doing okay overall, all questions answered & concerns addressed.     Review of Systems   Constitutional: Negative.    HENT: Negative.     Respiratory: Negative.     Cardiovascular: Negative.    Gastrointestinal: Negative.    Genitourinary: Negative.    Integumentary:  Negative.   Neurological: Negative.    Hematological: Negative.    Psychiatric/Behavioral: Negative.         Objective:      Physical Exam  Vitals  reviewed.   Constitutional:       General: He is not in acute distress.     Appearance: Normal appearance. He is not toxic-appearing.   HENT:      Mouth/Throat:      Mouth: Mucous membranes are moist.      Pharynx: Oropharynx is clear.   Eyes:      Conjunctiva/sclera: Conjunctivae normal.   Cardiovascular:      Rate and Rhythm: Normal rate and regular rhythm.   Pulmonary:      Effort: Pulmonary effort is normal. No respiratory distress.      Breath sounds: Normal breath sounds.   Abdominal:      General: Abdomen is flat. Bowel sounds are normal.      Palpations: Abdomen is soft.   Musculoskeletal:         General: Normal range of motion.      Cervical back: Normal range of motion.   Lymphadenopathy:      Cervical: No cervical adenopathy.   Skin:     General: Skin is warm and dry.   Neurological:      General: No focal deficit present.      Mental Status: He is alert and oriented to person, place, and time. Mental status is at baseline.   Psychiatric:         Mood and Affect: Mood normal.         Behavior: Behavior normal.       Assessment:       Problem List Items Addressed This Visit          ID    HIV disease - Primary         Plan:           HIV disease  -     HIV-1 RNA, Quantitative, PCR with Reflex to Genotype; Future; Expected date: 12/27/2022  -     CD4 T-[place] Cells; Future; Expected date: 12/27/2022  -     Urinalysis; Future; Expected date: 12/27/2022  -     Vitamin D; Future; Expected date: 12/27/2022  -     Hepatitis C Antibody; Future; Expected date: 12/27/2022  -     SYPHILIS ANTIBODY (WITH REFLEX RPR); Future; Expected date: 12/27/2022  -     Chlamydia/GC, PCR; Future; Expected date: 12/27/2022  -     Comprehensive Metabolic Panel; Future; Expected date: 12/27/2022  -     CBC Auto Differential; Future; Expected date: 12/27/2022  -     Quantiferon Gold TB; Future; Expected date: 12/27/2022  -     DXA Bone Density Spine And Hip; Future; Expected date: 01/10/2023  Adherence and sexual health counseling  done.  Use condoms for all sexual encounters.  Blood precautions.   Continue Genvoya as directed.  Labs today.  RTC 3 months with Ashley.     HIV Wellness:  Anal pap: 2019 NIL, no history of anal intercourse.   Oral CT/GC: 2019 neg  Anal CT/GC: 2019 neg  Urine CT/GC: 1/22 neg  RPR: 1/22 NR, 9/22 NR  Ophth:  4/21  DEXA: 3/17 WNL, not covered by insurance 9/22  Colonoscopy: 7/2014 at [facility], 6/9/21 Dr. Lee, repeat in 5 yrs    Hyperlipidemia, unspecified hyperlipidemia type  -     TSH; Future; Expected date: 12/27/2022  -     Hemoglobin A1C; Future; Expected date: 12/27/2022  -     Lipid Panel; Future; Expected date: 12/27/2022  Decrease intake of fried & greasy foods.    Increase intake of Omega 3 rich foods in diet such as fatty fish, nuts, avocado, etc.  Avoid trans fat in diet, commonly found in packaged foods such as snacks/cakes/cookies.  Increase fiber intake.   Increase exercise to at least 30 minutes of moderate activity 3-5 days per week.  Continue atorvastatin as prescribed.    Vitamin D deficiency, unspecified  -     Vitamin D; Future; Expected date: 12/27/2022  -     DXA Bone Density Spine And Hip; Future; Expected date: 01/10/2023  DEXA next available.  Agrees to pay if not covered by insurance.     Other hypoglycemia  -     Hemoglobin A1C; Future; Expected date: 12/27/2022

## 2022-12-28 LAB — HIV1 RNA # PLAS NAA DL=20: 23 COPIES/ML

## 2022-12-29 ENCOUNTER — TELEPHONE (OUTPATIENT)
Dept: INFECTIOUS DISEASES | Facility: CLINIC | Age: 62
End: 2022-12-29
Payer: MEDICARE

## 2022-12-29 LAB
AGE: 64
BACTERIA UR CULT: ABNORMAL
CD3+CD4+ CELLS # BLD: 48 % (ref 28–48)
CD3+CD4+ CELLS # SPEC: 1507.97 UNIT/L (ref 589–1505)
CD3+CD4+ CELLS NFR BLD: 47.6 %
GAMMA INTERFERON BACKGROUND BLD IA-ACNC: 0.08 IU/ML
LYMPHOCYTES # BLD AUTO: 3168 X10(3)/MCL (ref 1260–5520)
LYMPHOMA - T-CELL MARKERS SPEC-IMP: ABNORMAL
M TB IFN-G BLD-IMP: NEGATIVE
M TB IFN-G CD4+ BCKGRND COR BLD-ACNC: 0 IU/ML
M TB IFN-G CD4+CD8+ BCKGRND COR BLD-ACNC: -0.01 IU/ML
MITOGEN IGNF BCKGRD COR BLD-ACNC: 9.92 IU/ML
WBC # BLD AUTO: 6600 /MM3 (ref 4500–11500)

## 2022-12-29 NOTE — TELEPHONE ENCOUNTER
Lab results reviewed.  Urine is positive for UTI with E.coli, pan-sensitive.  He will need treatment with Macrobid 100 mg bid for 7 days, no refills. The only preferred pharmacy on file for him is [facility] and I would like for him to start treatment asap.  I attempted to phone him to give results, treatment plan, and to find out which local pharmacy he would like to use.  No answer.  Please phone pt & call in prescription to his preferred local pharmacy for UTI.  Thank you.

## 2022-12-30 RX ORDER — NITROFURANTOIN 25; 75 MG/1; MG/1
100 CAPSULE ORAL
COMMUNITY
End: 2023-04-27

## 2022-12-30 NOTE — TELEPHONE ENCOUNTER
Spoke to pt lab results given, pt verbalized understanding. He requested meds be sent to [facility] on [address], called in rx to S[facility] (pharmacist)

## 2023-01-11 DIAGNOSIS — B20 HIV DISEASE: ICD-10-CM

## 2023-01-11 RX ORDER — ELVITEGRAVIR, COBICISTAT, EMTRICITABINE, AND TENOFOVIR ALAFENAMIDE 150; 150; 200; 10 MG/1; MG/1; MG/1; MG/1
1 TABLET ORAL DAILY
Qty: 30 TABLET | Refills: 2 | Status: SHIPPED | OUTPATIENT
Start: 2023-01-11 | End: 2023-04-27 | Stop reason: SDUPTHER

## 2023-01-11 NOTE — TELEPHONE ENCOUNTER
Received fax refill request from [facility]    Last visit with Ashley Fatima, APRN: 12/27/2022  Last visit in [facility] INFECTIOUS DISEASE: 12/27/2022    Patient's next visit in [facility] INFECTIOUS DISEASE: 4/27/2023

## 2023-04-27 ENCOUNTER — OFFICE VISIT (OUTPATIENT)
Dept: INFECTIOUS DISEASES | Facility: CLINIC | Age: 63
End: 2023-04-27
Payer: MEDICARE

## 2023-04-27 VITALS
HEIGHT: 66 IN | TEMPERATURE: 98 F | DIASTOLIC BLOOD PRESSURE: 78 MMHG | RESPIRATION RATE: 16 BRPM | BODY MASS INDEX: 24.91 KG/M2 | SYSTOLIC BLOOD PRESSURE: 133 MMHG | WEIGHT: 155 LBS | HEART RATE: 78 BPM

## 2023-04-27 DIAGNOSIS — B20 HIV DISEASE: Primary | ICD-10-CM

## 2023-04-27 DIAGNOSIS — E78.5 HYPERLIPIDEMIA, UNSPECIFIED HYPERLIPIDEMIA TYPE: ICD-10-CM

## 2023-04-27 PROCEDURE — 99214 PR OFFICE/OUTPT VISIT, EST, LEVL IV, 30-39 MIN: ICD-10-PCS | Mod: S$PBB,,, | Performed by: NURSE PRACTITIONER

## 2023-04-27 PROCEDURE — 99214 OFFICE O/P EST MOD 30 MIN: CPT | Mod: S$PBB,,, | Performed by: NURSE PRACTITIONER

## 2023-04-27 PROCEDURE — 99213 OFFICE O/P EST LOW 20 MIN: CPT | Mod: PBBFAC | Performed by: NURSE PRACTITIONER

## 2023-04-27 RX ORDER — ELVITEGRAVIR, COBICISTAT, EMTRICITABINE, AND TENOFOVIR ALAFENAMIDE 150; 150; 200; 10 MG/1; MG/1; MG/1; MG/1
1 TABLET ORAL DAILY
Qty: 30 TABLET | Refills: 4 | Status: SHIPPED | OUTPATIENT
Start: 2023-04-27 | End: 2023-08-29 | Stop reason: SDUPTHER

## 2023-04-27 NOTE — PROGRESS NOTES
Subjective     Patient ID: Chad Richardson is a 63 y.o. male.    Chief Complaint: Followup HIV (Denies problems)    4/27/23  Chad is a 62 yo WM here today for HIV f/u visit.  He takes Genvoya daily & is virally suppressed. Labs 12/22 VL 23, CD4 1508.  Completed Macrobid for UTI, asymptomatic. Cholesterol well controlled with atorvastatin. He is taking Vitamin D as prescribed. Missed DEXA as he got in an accident on his way there then ended up going to a bar instead of going for exam.  He tells me that he has been drinking several beers daily. He knows that he needs to quit but is not inclined to do so at this time. He did have a new female sexual partner since last visit, condom used. All questions answered & concerns addressed.    12/27/22  Chad is a 63 yo WM here today for HIV f/u visit.  He is taking Genvoya daily & tolerates it well without any noted side effects.  Labs 9/26/22 VL UD, CD4 937.  Renal function stable.  He remains smoke free.  Efforts applauded. Lipids improved with atorvastatin.  He is not sexually active.  DEXA not covered by insurance.  Pt tells me that he agrees to pay for screening if not covered.  Order placed. Has no concerns or questions today.       9/26/22  Chad is a 63 yo WM presenting today for HIV f/u visit.  He takes Genvoya daily but did not have a dose yesterday or today due to delay in delivery from pharmacy.  He has contacted pharmacy & is expecting delivery today.  Tolerates well, last labs 5/22 VL UD, CD4 990.  Will update labs today.  Appreciates flu vax today.  He continues with atorvastatin daily for HLD.  Due for f/u visit with Dr. RANCHO Kim, PCP. Will call for annual visit.  He tells me that he has quit smoking since last visit.  Efforts applauded.  All questions answered & concerns addressed.     Review of Systems   Constitutional: Negative.    HENT: Negative.     Respiratory: Negative.     Cardiovascular: Negative.    Gastrointestinal: Negative.     Genitourinary: Negative.    Integumentary:  Negative.   Neurological: Negative.    Hematological: Negative.    Psychiatric/Behavioral: Negative.          Objective     Physical Exam  Vitals reviewed.   Constitutional:       General: He is not in acute distress.     Appearance: Normal appearance. He is not toxic-appearing.   HENT:      Mouth/Throat:      Mouth: Mucous membranes are moist.      Pharynx: Oropharynx is clear.   Eyes:      Conjunctiva/sclera: Conjunctivae normal.   Cardiovascular:      Rate and Rhythm: Normal rate and regular rhythm.   Pulmonary:      Effort: Pulmonary effort is normal. No respiratory distress.      Breath sounds: Normal breath sounds.   Abdominal:      General: Abdomen is flat. Bowel sounds are normal.      Palpations: Abdomen is soft.   Musculoskeletal:         General: Normal range of motion.      Cervical back: Normal range of motion.   Lymphadenopathy:      Cervical: No cervical adenopathy.   Skin:     General: Skin is warm and dry.   Neurological:      General: No focal deficit present.      Mental Status: He is alert and oriented to person, place, and time. Mental status is at baseline.   Psychiatric:         Mood and Affect: Mood normal.         Behavior: Behavior normal.          Assessment and Plan     Problem List Items Addressed This Visit          ID    HIV disease - Primary     HIV disease  -     GENVOYA [phone] mg Tab; Take 1 tablet by mouth once daily.  Dispense: 30 tablet; Refill: 4  -     Cancel: CBC Auto Differential; Future; Expected date: 04/27/2023  -     Cancel: Comprehensive Metabolic Panel  -     Cancel: HIV-1 RNA, Quantitative, PCR with Reflex to Genotype; Future; Expected date: 04/27/2023  -     CBC Auto Differential; Future; Expected date: 04/27/2023  -     Comprehensive Metabolic Panel; Future; Expected date: 04/27/2023  -     HIV-1 RNA, Quantitative, PCR with Reflex to Genotype; Future; Expected date: 04/27/2023  Adherence and sexual health  counseling done.  Use condoms for all sexual encounters.  Blood precautions.   Continue Genvoya as directed.  Labs today.  RTC 3 months with Ashley.     HIV Wellness:  Anal pap: 2019 NIL, no history of anal intercourse.   Oral CT/GC: 2019 neg  Anal CT/GC: 2019 neg  Urine CT/GC: 1/22 neg, 4/23  RPR: 9/22 NR, 4/23  Ophth:  4/21  DEXA: 3/17 WNL, not covered by insurance 9/22  Colonoscopy: 7/2014 at [facility], 6/9/21 Dr. Lee, repeat in 5 yrs     Hyperlipidemia, unspecified hyperlipidemia type  Decrease intake of fried & greasy foods.    Increase intake of Omega 3 rich foods in diet such as fatty fish, nuts, avocado, etc.  Avoid trans fat in diet, commonly found in packaged foods such as snacks/cakes/cookies.  Increase fiber intake.   Increase exercise to at least 30 minutes of moderate activity 3-5 days per week.  Continue atorvastatin as prescribed.

## 2023-07-03 ENCOUNTER — HOSPITAL ENCOUNTER (OUTPATIENT)
Dept: RADIOLOGY | Facility: HOSPITAL | Age: 63
Discharge: HOME | End: 2023-07-03
Attending: NURSE PRACTITIONER
Payer: MEDICARE

## 2023-07-03 DIAGNOSIS — B20 HIV DISEASE: ICD-10-CM

## 2023-07-03 DIAGNOSIS — E55.9 VITAMIN D DEFICIENCY, UNSPECIFIED: ICD-10-CM

## 2023-07-03 PROCEDURE — 77081 DXA BONE DENSITY APPENDICULR: CPT | Mod: DBM,TC

## 2023-07-03 PROCEDURE — 77080 DXA BONE DENSITY AXIAL: CPT | Mod: 26,XU,, | Performed by: STUDENT IN AN ORGANIZED HEALTH CARE EDUCATION/TRAINING PROGRAM

## 2023-07-03 PROCEDURE — 77080 DXA BONE DENSITY AXIAL: CPT | Mod: XU,TC

## 2023-07-03 PROCEDURE — 77080 DEXA BONE DENSITY SPINE HIP: ICD-10-PCS | Mod: 26,XU,, | Performed by: STUDENT IN AN ORGANIZED HEALTH CARE EDUCATION/TRAINING PROGRAM

## 2023-07-03 PROCEDURE — 77081 DEXA BONE DENSITY APPENDICULAR SKELETON: ICD-10-PCS | Mod: 26,DBM,, | Performed by: STUDENT IN AN ORGANIZED HEALTH CARE EDUCATION/TRAINING PROGRAM

## 2023-07-03 PROCEDURE — 77081 DXA BONE DENSITY APPENDICULR: CPT | Mod: 26,DBM,, | Performed by: STUDENT IN AN ORGANIZED HEALTH CARE EDUCATION/TRAINING PROGRAM

## 2023-08-29 ENCOUNTER — OFFICE VISIT (OUTPATIENT)
Dept: INFECTIOUS DISEASES | Facility: CLINIC | Age: 63
End: 2023-08-29
Payer: MEDICARE

## 2023-08-29 VITALS
HEART RATE: 80 BPM | BODY MASS INDEX: 25.07 KG/M2 | RESPIRATION RATE: 16 BRPM | WEIGHT: 156 LBS | DIASTOLIC BLOOD PRESSURE: 75 MMHG | TEMPERATURE: 98 F | SYSTOLIC BLOOD PRESSURE: 139 MMHG | HEIGHT: 66 IN

## 2023-08-29 DIAGNOSIS — E78.00 PURE HYPERCHOLESTEROLEMIA: ICD-10-CM

## 2023-08-29 DIAGNOSIS — Z23 NEED FOR VACCINATION: ICD-10-CM

## 2023-08-29 DIAGNOSIS — B20 HIV DISEASE: Primary | ICD-10-CM

## 2023-08-29 LAB
ALBUMIN SERPL-MCNC: 3.9 G/DL (ref 3.4–4.8)
ALBUMIN/GLOB SERPL: 1.2 RATIO (ref 1.1–2)
ALP SERPL-CCNC: 79 UNIT/L (ref 40–150)
ALT SERPL-CCNC: 19 UNIT/L (ref 0–55)
AST SERPL-CCNC: 18 UNIT/L (ref 5–34)
BILIRUB SERPL-MCNC: 0.4 MG/DL
BUN SERPL-MCNC: 11.5 MG/DL (ref 8.4–25.7)
CALCIUM SERPL-MCNC: 9.2 MG/DL (ref 8.8–10)
CHLORIDE SERPL-SCNC: 109 MMOL/L (ref 98–107)
CO2 SERPL-SCNC: 22 MMOL/L (ref 23–31)
CREAT SERPL-MCNC: 0.77 MG/DL (ref 0.73–1.18)
GFR SERPLBLD CREATININE-BSD FMLA CKD-EPI: >60 MLS/MIN/1.73/M2
GLOBULIN SER-MCNC: 3.2 GM/DL (ref 2.4–3.5)
GLUCOSE SERPL-MCNC: 101 MG/DL (ref 82–115)
POTASSIUM SERPL-SCNC: 4 MMOL/L (ref 3.5–5.1)
PROT SERPL-MCNC: 7.1 GM/DL (ref 5.8–7.6)
SODIUM SERPL-SCNC: 138 MMOL/L (ref 136–145)

## 2023-08-29 PROCEDURE — 99213 OFFICE O/P EST LOW 20 MIN: CPT | Mod: PBBFAC,25 | Performed by: NURSE PRACTITIONER

## 2023-08-29 PROCEDURE — 36415 COLL VENOUS BLD VENIPUNCTURE: CPT | Performed by: NURSE PRACTITIONER

## 2023-08-29 PROCEDURE — 80053 COMPREHEN METABOLIC PANEL: CPT | Performed by: NURSE PRACTITIONER

## 2023-08-29 PROCEDURE — 90472 IMMUNIZATION ADMIN EACH ADD: CPT | Mod: PBBFAC

## 2023-08-29 PROCEDURE — 99214 OFFICE O/P EST MOD 30 MIN: CPT | Mod: S$PBB,,, | Performed by: NURSE PRACTITIONER

## 2023-08-29 PROCEDURE — 90677 PCV20 VACCINE IM: CPT | Mod: PBBFAC

## 2023-08-29 PROCEDURE — 99214 PR OFFICE/OUTPT VISIT, EST, LEVL IV, 30-39 MIN: ICD-10-PCS | Mod: S$PBB,,, | Performed by: NURSE PRACTITIONER

## 2023-08-29 PROCEDURE — 87536 HIV-1 QUANT&REVRSE TRNSCRPJ: CPT | Performed by: NURSE PRACTITIONER

## 2023-08-29 PROCEDURE — 90750 HZV VACC RECOMBINANT IM: CPT | Mod: PBBFAC

## 2023-08-29 RX ORDER — ELVITEGRAVIR, COBICISTAT, EMTRICITABINE, AND TENOFOVIR ALAFENAMIDE 150; 150; 200; 10 MG/1; MG/1; MG/1; MG/1
1 TABLET ORAL DAILY
Qty: 30 TABLET | Refills: 4 | Status: SHIPPED | OUTPATIENT
Start: 2023-08-29 | End: 2024-01-04 | Stop reason: SDUPTHER

## 2023-08-29 RX ORDER — ATORVASTATIN CALCIUM 20 MG/1
20 TABLET, FILM COATED ORAL DAILY
Qty: 90 TABLET | Refills: 1 | Status: SHIPPED | OUTPATIENT
Start: 2023-08-29 | End: 2024-01-04 | Stop reason: SDUPTHER

## 2023-08-29 NOTE — PROGRESS NOTES
Patient ID: Chad Richardson 63 y.o.     Chief Complaint:   Chief Complaint   Patient presents with    Followup HIV     States having stress and troubles right now        HPI:  8/29/23  Chad is a 62 yo WM presenting today for HIV f/u visit.  He is virally suppressed on Genvoya, tolerates it well.  Labs 4/23 VL <20, 12/22 CD4 1508.  Remains on atorvastatin daily as well.  Due for Shingrix & PCV 20 today, amenable to both.  Today, he is feeling sad as his partner of 31 years passed way a couple of months ago here at our facility.  He is still actively grieving her loss.  Sympathy & support provided, voiced appreciation. All questions answered & concerns addressed.    4/27/23  Chad is a 62 yo WM here today for HIV f/u visit.  He takes Genvoya daily & is virally suppressed. Labs 12/22 VL 23, CD4 1508.  Completed Macrobid for UTI, asymptomatic. Cholesterol well controlled with atorvastatin. He is taking Vitamin D as prescribed. Missed DEXA as he got in an accident on his way there then ended up going to a bar instead of going for exam.  He tells me that he has been drinking several beers daily. He knows that he needs to quit but is not inclined to do so at this time. He did have a new female sexual partner since last visit, condom used. All questions answered & concerns addressed.     12/27/22  Chad is a 61 yo WM here today for HIV f/u visit.  He is taking Genvoya daily & tolerates it well without any noted side effects.  Labs 9/26/22 VL UD, CD4 937.  Renal function stable.  He remains smoke free.  Efforts applauded. Lipids improved with atorvastatin.  He is not sexually active.  DEXA not covered by insurance.  Pt tells me that he agrees to pay for screening if not covered.  Order placed. Has no concerns or questions today.        Past Medical History:   Diagnosis Date    Below knee amputation     HIV infection     Hyperlipidemia         Past Surgical History:   Procedure Laterality Date    BELOW KNEE  AMPUTATION OF LOWER EXTREMITY      COLONOSCOPY  2021    Dr Felix Lee    SPLENECTOMY, TOTAL          Social History     Socioeconomic History    Marital status: Single   Tobacco Use    Smoking status: Former     Current packs/day: 1.00     Types: Cigarettes    Smokeless tobacco: Never   Substance and Sexual Activity    Alcohol use: Yes     Alcohol/week: 7.0 standard drinks of alcohol     Types: 7 Cans of beer per week     Comment: daily    Drug use: Not Currently    Sexual activity: Not Currently     Partners: Female     Birth control/protection: Condom     Comment: States s/o  approx 3 months ago        Family History   Problem Relation Age of Onset    Diabetes Mother     Diabetes Sister     Diabetes Brother         Review of patient's allergies indicates:  No Known Allergies     Immunization History   Administered Date(s) Administered    Hepatitis A, Adult 2012    Hepatitis B, Adult 2014    Influenza - Quadrivalent 10/12/2016, 10/30/2017, 2019, 2020, 12/15/2021    Influenza - Quadrivalent - PF (6-35 months) 10/30/2017, 2018    Influenza - Quadrivalent - PF *Preferred* (6 months and older) 10/30/2017, 2019, 2022    Influenza - Trivalent - PF (ADULT) 10/11/2013, 10/16/2014, 10/27/2015    MMR 2015    Meningococcal Conjugate (MCV4P) 2018, 2018    Pneumococcal Conjugate - 13 Valent 2014, 01/15/2020    Pneumococcal Conjugate - 20 Valent 2023    Pneumococcal Polysaccharide - 23 Valent 2012, 2018    Tdap 2014    Zoster Recombinant 2023        Review of Systems   Constitutional: Negative.    HENT: Negative.     Eyes: Negative.    Respiratory: Negative.     Cardiovascular: Negative.    Gastrointestinal: Negative.    Genitourinary: Negative.    Musculoskeletal: Negative.    Skin: Negative.    Neurological: Negative.    Endo/Heme/Allergies: Negative.    Psychiatric/Behavioral: Negative.     All other systems  "reviewed and are negative.         Objective:      /75   Pulse 80   Temp 97.7 °F (36.5 °C) (Oral)   Resp 16   Ht 5' 6" (1.676 m)   Wt 70.8 kg (156 lb)   BMI 25.18 kg/m²      Physical Exam  Vitals reviewed.   Constitutional:       General: He is not in acute distress.     Appearance: Normal appearance. He is not toxic-appearing.   HENT:      Mouth/Throat:      Mouth: Mucous membranes are moist.      Pharynx: Oropharynx is clear.   Eyes:      Conjunctiva/sclera: Conjunctivae normal.   Cardiovascular:      Rate and Rhythm: Normal rate and regular rhythm.   Pulmonary:      Effort: Pulmonary effort is normal. No respiratory distress.      Breath sounds: Normal breath sounds.   Abdominal:      General: Abdomen is flat. Bowel sounds are normal.      Palpations: Abdomen is soft.   Musculoskeletal:         General: Normal range of motion.      Cervical back: Normal range of motion.   Lymphadenopathy:      Cervical: No cervical adenopathy.   Skin:     General: Skin is warm and dry.   Neurological:      General: No focal deficit present.      Mental Status: He is alert and oriented to person, place, and time. Mental status is at baseline.   Psychiatric:         Mood and Affect: Mood normal.         Behavior: Behavior normal.          Labs: Reviewed most recent relevant labs available, notable results highlighted in this note    Imaging: Reviewed most recent relevant imaging studies available, notable results highlighted in this note      Medications:     Current Outpatient Medications   Medication Instructions    atorvastatin (LIPITOR) 20 mg, Oral, Daily    GENVOYA [phone] mg Tab 1 tablet, Oral, Daily       Assessment:       Problem List Items Addressed This Visit          Cardiac/Vascular    Pure hypercholesterolemia    Relevant Medications    atorvastatin (LIPITOR) 20 MG tablet       ID    HIV disease - Primary    Relevant Medications    GENVOYA [phone] mg Tab    Other Relevant Orders    HIV-1 RNA, " Quantitative, PCR with Reflex to Genotype    Comprehensive Metabolic Panel     Other Visit Diagnoses       Need for vaccination        Relevant Orders    Pneumococcal Conjugate Vaccine (20 Valent) (IM) (Completed)    Zoster Recombinant Vaccine (Completed)               Plan:      HIV disease  -     GENVOYA [phone] mg Tab; Take 1 tablet by mouth once daily.  Dispense: 30 tablet; Refill: 4  -     HIV-1 RNA, Quantitative, PCR with Reflex to Genotype; Future; Expected date: 08/29/2023  -     Comprehensive Metabolic Panel  Adherence and sexual health counseling done.  Use condoms for all sexual encounters.  Blood precautions.   Continue Genvoya as directed.  Labs today.  RTC 4 months with Ashley.     HIV Wellness:  Anal pap: 2019 NIL, no history of anal intercourse.   Oral CT/GC: 2019 neg  Anal CT/GC: 2019 neg  Urine CT/GC: 1/22 neg, 12/22 Neg  RPR: 9/22 NR, 12/22 NR  Ophth:  4/21  DEXA: 3/17 WNL, not covered by insurance 9/22  Colonoscopy: 7/2014 at [facility], 6/9/21 Dr. Lee, repeat in 5 yrs    Pure hypercholesterolemia  -     atorvastatin (LIPITOR) 20 MG tablet; Take 1 tablet (20 mg total) by mouth once daily.  Dispense: 90 tablet; Refill: 1  Decrease intake of fried & greasy foods.    Increase intake of Omega 3 rich foods in diet such as fatty fish, nuts, avocado, etc.  Avoid trans fat in diet, commonly found in packaged foods such as snacks/cakes/cookies.  Increase fiber intake.   Increase exercise to at least 30 minutes of moderate activity 3-5 days per week.  Continue atorvastatin as prescribed.    Need for vaccination  -     Pneumococcal Conjugate Vaccine (20 Valent) (IM)  -     Zoster Recombinant Vaccine  Shingrix #1 today, #2 next visit.  PCV 20 today.

## 2023-08-31 LAB — HIV1 RNA # PLAS NAA DL=20: NORMAL COPIES/ML

## 2024-01-04 ENCOUNTER — OFFICE VISIT (OUTPATIENT)
Dept: INFECTIOUS DISEASES | Facility: CLINIC | Age: 64
End: 2024-01-04
Payer: MEDICARE

## 2024-01-04 VITALS
TEMPERATURE: 98 F | RESPIRATION RATE: 20 BRPM | HEIGHT: 66 IN | BODY MASS INDEX: 25.25 KG/M2 | DIASTOLIC BLOOD PRESSURE: 80 MMHG | HEART RATE: 89 BPM | WEIGHT: 157.13 LBS | SYSTOLIC BLOOD PRESSURE: 138 MMHG

## 2024-01-04 DIAGNOSIS — E78.00 PURE HYPERCHOLESTEROLEMIA: ICD-10-CM

## 2024-01-04 DIAGNOSIS — B20 HIV DISEASE: Primary | ICD-10-CM

## 2024-01-04 DIAGNOSIS — R79.9 ABNORMAL FINDING OF BLOOD CHEMISTRY, UNSPECIFIED: ICD-10-CM

## 2024-01-04 DIAGNOSIS — Z23 NEED FOR VACCINATION: ICD-10-CM

## 2024-01-04 DIAGNOSIS — Z12.5 PROSTATE CANCER SCREENING: ICD-10-CM

## 2024-01-04 DIAGNOSIS — E55.9 VITAMIN D DEFICIENCY, UNSPECIFIED: ICD-10-CM

## 2024-01-04 LAB
ALBUMIN SERPL-MCNC: 4.1 G/DL (ref 3.4–4.8)
ALBUMIN/GLOB SERPL: 1.1 RATIO (ref 1.1–2)
ALP SERPL-CCNC: 130 UNIT/L (ref 40–150)
ALT SERPL-CCNC: 22 UNIT/L (ref 0–55)
APPEARANCE UR: CLEAR
AST SERPL-CCNC: 23 UNIT/L (ref 5–34)
BACTERIA #/AREA URNS AUTO: ABNORMAL /HPF
BASOPHILS # BLD AUTO: 0.07 X10(3)/MCL
BASOPHILS NFR BLD AUTO: 0.9 %
BILIRUB SERPL-MCNC: 0.7 MG/DL
BILIRUB UR QL STRIP.AUTO: NEGATIVE
BUN SERPL-MCNC: 19.5 MG/DL (ref 8.4–25.7)
C TRACH DNA SPEC QL NAA+PROBE: NOT DETECTED
CALCIUM SERPL-MCNC: 9 MG/DL (ref 8.8–10)
CHLORIDE SERPL-SCNC: 104 MMOL/L (ref 98–107)
CHOLEST SERPL-MCNC: 200 MG/DL
CHOLEST/HDLC SERPL: 3 {RATIO} (ref 0–5)
CO2 SERPL-SCNC: 24 MMOL/L (ref 23–31)
COLOR UR AUTO: ABNORMAL
CREAT SERPL-MCNC: 1.01 MG/DL (ref 0.73–1.18)
DEPRECATED CALCIDIOL+CALCIFEROL SERPL-MC: 34.1 NG/ML (ref 30–80)
EOSINOPHIL # BLD AUTO: 0.51 X10(3)/MCL (ref 0–0.9)
EOSINOPHIL NFR BLD AUTO: 6.5 %
ERYTHROCYTE [DISTWIDTH] IN BLOOD BY AUTOMATED COUNT: 14.2 % (ref 11.5–17)
EST. AVERAGE GLUCOSE BLD GHB EST-MCNC: 116.9 MG/DL
GFR SERPLBLD CREATININE-BSD FMLA CKD-EPI: >60 MLS/MIN/1.73/M2
GLOBULIN SER-MCNC: 3.7 GM/DL (ref 2.4–3.5)
GLUCOSE SERPL-MCNC: 73 MG/DL (ref 82–115)
GLUCOSE UR QL STRIP.AUTO: NORMAL
HAV AB SER QL IA: REACTIVE
HBA1C MFR BLD: 5.7 %
HCT VFR BLD AUTO: 43.9 % (ref 42–52)
HCV AB SERPL QL IA: NONREACTIVE
HDLC SERPL-MCNC: 61 MG/DL (ref 35–60)
HGB BLD-MCNC: 14.1 G/DL (ref 14–18)
HYALINE CASTS #/AREA URNS LPF: ABNORMAL /LPF
IMM GRANULOCYTES # BLD AUTO: 0.01 X10(3)/MCL (ref 0–0.04)
IMM GRANULOCYTES NFR BLD AUTO: 0.1 %
KETONES UR QL STRIP.AUTO: NEGATIVE
LDLC SERPL CALC-MCNC: 124 MG/DL (ref 50–140)
LEUKOCYTE ESTERASE UR QL STRIP.AUTO: 250
LYMPHOCYTES # BLD AUTO: 3.75 X10(3)/MCL (ref 0.6–4.6)
LYMPHOCYTES NFR BLD AUTO: 47.5 %
MCH RBC QN AUTO: 28.3 PG (ref 27–31)
MCHC RBC AUTO-ENTMCNC: 32.1 G/DL (ref 33–36)
MCV RBC AUTO: 88 FL (ref 80–94)
MONOCYTES # BLD AUTO: 0.87 X10(3)/MCL (ref 0.1–1.3)
MONOCYTES NFR BLD AUTO: 11 %
MUCOUS THREADS URNS QL MICRO: ABNORMAL /LPF
N GONORRHOEA DNA SPEC QL NAA+PROBE: NOT DETECTED
NEUTROPHILS # BLD AUTO: 2.69 X10(3)/MCL (ref 2.1–9.2)
NEUTROPHILS NFR BLD AUTO: 34 %
NITRITE UR QL STRIP.AUTO: NEGATIVE
NRBC BLD AUTO-RTO: 0 %
PH UR STRIP.AUTO: 5 [PH]
PLATELET # BLD AUTO: 418 X10(3)/MCL (ref 130–400)
PMV BLD AUTO: 9.5 FL (ref 7.4–10.4)
POTASSIUM SERPL-SCNC: 4 MMOL/L (ref 3.5–5.1)
PROT SERPL-MCNC: 7.8 GM/DL (ref 5.8–7.6)
PROT UR QL STRIP.AUTO: NEGATIVE
PSA SERPL-MCNC: 1.82 NG/ML
RBC # BLD AUTO: 4.99 X10(6)/MCL (ref 4.7–6.1)
RBC #/AREA URNS AUTO: ABNORMAL /HPF
RBC UR QL AUTO: ABNORMAL
SODIUM SERPL-SCNC: 136 MMOL/L (ref 136–145)
SOURCE (OHS): NORMAL
SP GR UR STRIP.AUTO: 1.01 (ref 1–1.03)
SQUAMOUS #/AREA URNS LPF: ABNORMAL /HPF
T PALLIDUM AB SER QL: NONREACTIVE
TRIGL SERPL-MCNC: 74 MG/DL (ref 34–140)
TSH SERPL-ACNC: 1.94 UIU/ML (ref 0.35–4.94)
UROBILINOGEN UR STRIP-ACNC: NORMAL
VLDLC SERPL CALC-MCNC: 15 MG/DL
WBC # SPEC AUTO: 7.9 X10(3)/MCL (ref 4.5–11.5)
WBC #/AREA URNS AUTO: ABNORMAL /HPF

## 2024-01-04 PROCEDURE — 86361 T CELL ABSOLUTE COUNT: CPT | Performed by: NURSE PRACTITIONER

## 2024-01-04 PROCEDURE — 87536 HIV-1 QUANT&REVRSE TRNSCRPJ: CPT | Performed by: NURSE PRACTITIONER

## 2024-01-04 PROCEDURE — 85025 COMPLETE CBC W/AUTO DIFF WBC: CPT | Performed by: NURSE PRACTITIONER

## 2024-01-04 PROCEDURE — 86708 HEPATITIS A ANTIBODY: CPT | Performed by: NURSE PRACTITIONER

## 2024-01-04 PROCEDURE — 86780 TREPONEMA PALLIDUM: CPT | Performed by: NURSE PRACTITIONER

## 2024-01-04 PROCEDURE — 80053 COMPREHEN METABOLIC PANEL: CPT | Performed by: NURSE PRACTITIONER

## 2024-01-04 PROCEDURE — 99213 OFFICE O/P EST LOW 20 MIN: CPT | Mod: PBBFAC,25 | Performed by: NURSE PRACTITIONER

## 2024-01-04 PROCEDURE — 83036 HEMOGLOBIN GLYCOSYLATED A1C: CPT | Performed by: NURSE PRACTITIONER

## 2024-01-04 PROCEDURE — 80061 LIPID PANEL: CPT | Performed by: NURSE PRACTITIONER

## 2024-01-04 PROCEDURE — 84443 ASSAY THYROID STIM HORMONE: CPT | Performed by: NURSE PRACTITIONER

## 2024-01-04 PROCEDURE — 82306 VITAMIN D 25 HYDROXY: CPT | Performed by: NURSE PRACTITIONER

## 2024-01-04 PROCEDURE — 87491 CHLMYD TRACH DNA AMP PROBE: CPT | Performed by: NURSE PRACTITIONER

## 2024-01-04 PROCEDURE — 86803 HEPATITIS C AB TEST: CPT | Performed by: NURSE PRACTITIONER

## 2024-01-04 PROCEDURE — 90472 IMMUNIZATION ADMIN EACH ADD: CPT | Mod: PBBFAC

## 2024-01-04 PROCEDURE — 87077 CULTURE AEROBIC IDENTIFY: CPT | Performed by: NURSE PRACTITIONER

## 2024-01-04 PROCEDURE — 36415 COLL VENOUS BLD VENIPUNCTURE: CPT | Performed by: NURSE PRACTITIONER

## 2024-01-04 PROCEDURE — G0008 ADMIN INFLUENZA VIRUS VAC: HCPCS | Mod: PBBFAC

## 2024-01-04 PROCEDURE — 90750 HZV VACC RECOMBINANT IM: CPT | Mod: PBBFAC

## 2024-01-04 PROCEDURE — 90694 VACC AIIV4 NO PRSRV 0.5ML IM: CPT | Mod: PBBFAC

## 2024-01-04 PROCEDURE — 99214 OFFICE O/P EST MOD 30 MIN: CPT | Mod: S$PBB,,, | Performed by: NURSE PRACTITIONER

## 2024-01-04 PROCEDURE — 87086 URINE CULTURE/COLONY COUNT: CPT | Performed by: NURSE PRACTITIONER

## 2024-01-04 PROCEDURE — 81001 URINALYSIS AUTO W/SCOPE: CPT | Performed by: NURSE PRACTITIONER

## 2024-01-04 PROCEDURE — 86480 TB TEST CELL IMMUN MEASURE: CPT | Performed by: NURSE PRACTITIONER

## 2024-01-04 PROCEDURE — 84153 ASSAY OF PSA TOTAL: CPT | Performed by: NURSE PRACTITIONER

## 2024-01-04 RX ORDER — ATORVASTATIN CALCIUM 20 MG/1
20 TABLET, FILM COATED ORAL DAILY
Qty: 90 TABLET | Refills: 1 | Status: SHIPPED | OUTPATIENT
Start: 2024-01-04

## 2024-01-04 RX ORDER — ELVITEGRAVIR, COBICISTAT, EMTRICITABINE, AND TENOFOVIR ALAFENAMIDE 150; 150; 200; 10 MG/1; MG/1; MG/1; MG/1
1 TABLET ORAL DAILY
Qty: 30 TABLET | Refills: 4 | Status: SHIPPED | OUTPATIENT
Start: 2024-01-04

## 2024-01-04 RX ADMIN — INFLUENZA A VIRUS A/VICTORIA/4897/2022 IVR-238 (H1N1) ANTIGEN (FORMALDEHYDE INACTIVATED), INFLUENZA A VIRUS A/DARWIN/6/2021 IVR-227 (H3N2) ANTIGEN (FORMALDEHYDE INACTIVATED), INFLUENZA B VIRUS B/AUSTRIA/1359417/2021 BVR-26 ANTIGEN (FORMALDEHYDE INACTIVATED), INFLUENZA B VIRUS B/PHUKET/3073/2013 BVR-1B ANTIGEN (FORMALDEHYDE INACTIVATED) 0.5 ML: 15; 15; 15; 15 INJECTION, SUSPENSION INTRAMUSCULAR at 09:01

## 2024-01-04 RX ADMIN — ZOSTER VACCINE RECOMBINANT, ADJUVANTED 0.5 ML: KIT at 09:01
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
## 2024-01-04 NOTE — PROGRESS NOTES
High Dose Influenza Vaccination given IM left deltoid and Shingrix vaccination given IM right deltoid using aseptic tech. Patient tolerated well. To contact clinic prn.

## 2024-01-05 ENCOUNTER — TELEPHONE (OUTPATIENT)
Dept: INFECTIOUS DISEASES | Facility: CLINIC | Age: 64
End: 2024-01-05
Payer: MEDICARE

## 2024-01-05 DIAGNOSIS — N30.00 ACUTE CYSTITIS WITHOUT HEMATURIA: Primary | ICD-10-CM

## 2024-01-05 LAB — HIV1 RNA # PLAS NAA DL=20: <20 COPIES/ML

## 2024-01-05 RX ORDER — NITROFURANTOIN 25; 75 MG/1; MG/1
100 CAPSULE ORAL 2 TIMES DAILY
COMMUNITY
End: 2024-01-05 | Stop reason: SDUPTHER

## 2024-01-05 RX ORDER — NITROFURANTOIN 25; 75 MG/1; MG/1
100 CAPSULE ORAL 2 TIMES DAILY
Qty: 14 CAPSULE | Refills: 0 | Status: SHIPPED | OUTPATIENT
Start: 2024-01-05

## 2024-01-05 NOTE — TELEPHONE ENCOUNTER
Phoned patient. Discussed Urine positive for bladder infection with E.Coli. Will need antibiotics for treatment with Macrobid 100 mg bid x 7 days. The following recommendations given: Advised to drink lots of water, urinate regularly without holding urine, urinate before & after sex and wash hands carefully after restroom use. Complete entire course of antibiotics as prescribed. Requests rx to be sent to [facility] on [address]. Voiced understanding and appreciates call.

## 2024-01-05 NOTE — TELEPHONE ENCOUNTER
----- Message from TREE Irvin sent at 1/5/2024  1:08 PM CST -----  Urine positive for bladder infection with E.Coli. He will need antibiotics for treatment with Macrobid 100 mg bid x 7 days. Where should we send this?     Please phone pt with results and the following recommendations: Please advise to drink lots of water, urinate regularly without holding urine, urinate before & after sex and wash hands carefully after restroom use. Complete entire course of antibiotics as prescribed.     Thank you.

## 2024-01-05 NOTE — PROGRESS NOTES
Urine positive for bladder infection with E.Coli. He will need antibiotics for treatment with Macrobid 100 mg bid x 7 days. Where should we send this?     Please phone pt with results and the following recommendations: Please advise to drink lots of water, urinate regularly without holding urine, urinate before & after sex and wash hands carefully after restroom use. Complete entire course of antibiotics as prescribed.     Thank you.

## 2024-01-06 LAB — BACTERIA UR CULT: ABNORMAL

## 2024-01-07 LAB
AGE: 65
CD3+CD4+ CELLS # SPEC: 1524 UNIT/L (ref 589–1505)
CD3+CD4+ CELLS NFR BLD: 40.2 %
GAMMA INTERFERON BACKGROUND BLD IA-ACNC: 0.03 IU/ML
LYMPHOCYTES # BLD AUTO: 3792 X10(3)/MCL (ref 1260–5520)
LYMPHOCYTES NFR LN MANUAL: 48 % (ref 28–48)
LYMPHOMA - T-CELL MARKERS SPEC-IMP: ABNORMAL
M TB IFN-G BLD-IMP: NEGATIVE
M TB IFN-G CD4+ BCKGRND COR BLD-ACNC: 0 IU/ML
M TB IFN-G CD4+CD8+ BCKGRND COR BLD-ACNC: -0.01 IU/ML
MITOGEN IGNF BCKGRD COR BLD-ACNC: >10 IU/ML
WBC # BLD AUTO: 7900 /MM3 (ref 4500–11500)

## 2024-05-09 ENCOUNTER — LAB VISIT (OUTPATIENT)
Dept: LAB | Facility: HOSPITAL | Age: 64
End: 2024-05-09
Attending: NURSE PRACTITIONER
Payer: MEDICARE

## 2024-05-09 ENCOUNTER — OFFICE VISIT (OUTPATIENT)
Dept: INFECTIOUS DISEASES | Facility: CLINIC | Age: 64
End: 2024-05-09
Payer: MEDICARE

## 2024-05-09 VITALS
HEART RATE: 92 BPM | HEIGHT: 66 IN | WEIGHT: 159.5 LBS | DIASTOLIC BLOOD PRESSURE: 61 MMHG | RESPIRATION RATE: 14 BRPM | BODY MASS INDEX: 25.63 KG/M2 | SYSTOLIC BLOOD PRESSURE: 111 MMHG | TEMPERATURE: 99 F

## 2024-05-09 DIAGNOSIS — E78.00 PURE HYPERCHOLESTEROLEMIA: ICD-10-CM

## 2024-05-09 DIAGNOSIS — B20 HIV DISEASE: ICD-10-CM

## 2024-05-09 DIAGNOSIS — B20 HIV DISEASE: Primary | ICD-10-CM

## 2024-05-09 LAB
ALBUMIN SERPL-MCNC: 4.1 G/DL (ref 3.4–4.8)
ALBUMIN/GLOB SERPL: 1.4 RATIO (ref 1.1–2)
ALP SERPL-CCNC: 93 UNIT/L (ref 40–150)
ALT SERPL-CCNC: 20 UNIT/L (ref 0–55)
AST SERPL-CCNC: 19 UNIT/L (ref 5–34)
BILIRUB SERPL-MCNC: 0.6 MG/DL
BUN SERPL-MCNC: 11.3 MG/DL (ref 8.4–25.7)
CALCIUM SERPL-MCNC: 9.3 MG/DL (ref 8.8–10)
CHLORIDE SERPL-SCNC: 109 MMOL/L (ref 98–107)
CO2 SERPL-SCNC: 24 MMOL/L (ref 23–31)
CREAT SERPL-MCNC: 0.87 MG/DL (ref 0.73–1.18)
GFR SERPLBLD CREATININE-BSD FMLA CKD-EPI: >60 ML/MIN/1.73/M2
GLOBULIN SER-MCNC: 3 GM/DL (ref 2.4–3.5)
GLUCOSE SERPL-MCNC: 102 MG/DL (ref 82–115)
HBV SURFACE AB SER-ACNC: 480.21 MIU/ML
HBV SURFACE AB SERPL IA-ACNC: REACTIVE M[IU]/ML
POTASSIUM SERPL-SCNC: 4.3 MMOL/L (ref 3.5–5.1)
PROT SERPL-MCNC: 7.1 GM/DL (ref 5.8–7.6)
SODIUM SERPL-SCNC: 141 MMOL/L (ref 136–145)

## 2024-05-09 PROCEDURE — 87536 HIV-1 QUANT&REVRSE TRNSCRPJ: CPT

## 2024-05-09 PROCEDURE — 86706 HEP B SURFACE ANTIBODY: CPT

## 2024-05-09 PROCEDURE — 80053 COMPREHEN METABOLIC PANEL: CPT

## 2024-05-09 PROCEDURE — 99214 OFFICE O/P EST MOD 30 MIN: CPT | Mod: S$PBB,,, | Performed by: NURSE PRACTITIONER

## 2024-05-09 PROCEDURE — 99213 OFFICE O/P EST LOW 20 MIN: CPT | Mod: PBBFAC | Performed by: NURSE PRACTITIONER

## 2024-05-09 PROCEDURE — 36415 COLL VENOUS BLD VENIPUNCTURE: CPT

## 2024-05-09 RX ORDER — BICTEGRAVIR SODIUM, EMTRICITABINE, AND TENOFOVIR ALAFENAMIDE FUMARATE 50; 200; 25 MG/1; MG/1; MG/1
1 TABLET ORAL DAILY
Qty: 30 TABLET | Refills: 4 | Status: SHIPPED | OUTPATIENT
Start: 2024-05-09

## 2024-05-09 RX ORDER — ATORVASTATIN CALCIUM 20 MG/1
20 TABLET, FILM COATED ORAL DAILY
Qty: 90 TABLET | Refills: 1 | Status: SHIPPED | OUTPATIENT
Start: 2024-05-09

## 2024-05-09 NOTE — PROGRESS NOTES
Patient ID: Chad Richardson 64 y.o.     Chief Complaint:   Chief Complaint   Patient presents with    Followup HIV     Denies problems        HPI:    5/9/24  Chad is a 65 yo WM presenting today for HIV f/u visit. He is virally suppressed on Genvoya, tolerates well. Labs 1/24 VL <20, CD4 1524, STI screenings negative. Discussed recommendation for enhancer free ART with option to change to Biktarvy. He tells me that his insurance has been recommending this as well for preferred medication. Amenable to ART revision as  discussed. He tells me that he is feeling fine from a urinary standpoint and did complete Macrobid as prescribed. Cholesterol well controlled. All questions answered & concerns addressed.    1/4/24  Chad is a 64 yo WM here today for HIV f/u visit.  He is taking Genvoya daily with viral suppression. Labs 8/23 VL UD.  DEXA completed 7/23, WNL. Remains in care with PCP Dr. Kim, routine annual screening labs as recommended. He is taking atorvastatin daily for HLD, fasting lipids today.  He completed 1st dose of Shingrix last visit, did have some body aches, fatigue, and chills. He is amenable to 2nd dose today, will take analgesic today for symptom management. Also amenable to flu vaccine today. He is doing well overall & has no concerns or questions today.     08/29/23  Chad is a 64 yo WM presenting today for HIV f/u visit.  He is virally suppressed on Genvoya, tolerates it well.  Labs 4/23 VL <20, 12/22 CD4 1508.  Remains on atorvastatin daily as well.  Due for Shingrix & PCV 20 today, amenable to both.  Today, he is feeling sad as his partner of 31 years passed way a couple of months ago here at our facility.  He is still actively grieving her loss.  Sympathy & support provided, voiced appreciation. All questions answered & concerns addressed.              Past Medical History:   Diagnosis Date    Below knee amputation     HIV infection     Hyperlipidemia         Past Surgical History:    Procedure Laterality Date    BELOW KNEE AMPUTATION OF LOWER EXTREMITY      COLONOSCOPY  2021    Dr Felix Lee    SPLENECTOMY, TOTAL          Social History     Socioeconomic History    Marital status: Single   Tobacco Use    Smoking status: Former     Current packs/day: 0.25     Average packs/day: 1 pack/day for 54.4 years (54.2 ttl pk-yrs)     Types: Cigarettes     Start date:     Smokeless tobacco: Never   Substance and Sexual Activity    Alcohol use: Yes     Alcohol/week: 7.0 standard drinks of alcohol     Types: 7 Cans of beer per week     Comment: daily    Drug use: Not Currently    Sexual activity: Not Currently     Partners: Female     Birth control/protection: Condom     Comment: States s/o         Family History   Problem Relation Name Age of Onset    Diabetes Mother      Diabetes Sister      Diabetes Brother          Review of patient's allergies indicates:  No Known Allergies     Immunization History   Administered Date(s) Administered    Hepatitis A, Adult 2012    Hepatitis B, Adult 2014    Influenza 10/11/2013    Influenza (FLUAD) - Quadrivalent - Adjuvanted - PF *Preferred* (65+) 2024    Influenza - Quadrivalent 10/12/2016, 10/30/2017, 2019, 2020, 12/15/2021    Influenza - Quadrivalent - PF (6-35 months) 10/30/2017, 2018    Influenza - Quadrivalent - PF *Preferred* (6 months and older) 10/30/2017, 2019, 2022    Influenza - Trivalent - PF (ADULT) 10/11/2013, 10/16/2014, 10/27/2015, 10/30/2017    MMR 2015    Meningococcal Conjugate (MCV4P) 2018, 2018    Pneumococcal Conjugate - 13 Valent 2014, 01/15/2020    Pneumococcal Conjugate - 20 Valent 2023    Pneumococcal Polysaccharide - 23 Valent 2012, 2018    Tdap 2014    Zoster Recombinant 2023, 2024        Review of Systems   Constitutional: Negative.    HENT: Negative.     Eyes: Negative.    Respiratory: Negative.    "  Cardiovascular: Negative.    Gastrointestinal: Negative.    Genitourinary: Negative.    Musculoskeletal: Negative.    Skin: Negative.    Neurological: Negative.    Endo/Heme/Allergies: Negative.    Psychiatric/Behavioral: Negative.     All other systems reviewed and are negative.         Objective:      /61 (BP Location: Right arm, Patient Position: Sitting, BP Method: Medium (Automatic))   Pulse 92   Temp 98.5 °F (36.9 °C) (Oral)   Resp 14   Ht 5' 6" (1.676 m)   Wt 72.3 kg (159 lb 8 oz)   BMI 25.74 kg/m²      Physical Exam  Vitals reviewed.   Constitutional:       General: He is not in acute distress.     Appearance: Normal appearance. He is not toxic-appearing.   HENT:      Mouth/Throat:      Mouth: Mucous membranes are moist.      Pharynx: Oropharynx is clear.   Eyes:      Conjunctiva/sclera: Conjunctivae normal.   Cardiovascular:      Rate and Rhythm: Normal rate and regular rhythm.   Pulmonary:      Effort: Pulmonary effort is normal. No respiratory distress.      Breath sounds: Normal breath sounds.   Abdominal:      General: Abdomen is flat. Bowel sounds are normal.      Palpations: Abdomen is soft.   Musculoskeletal:         General: Normal range of motion.      Cervical back: Normal range of motion.      Comments: RLE amputee   Lymphadenopathy:      Cervical: No cervical adenopathy.   Skin:     General: Skin is warm and dry.   Neurological:      General: No focal deficit present.      Mental Status: He is alert and oriented to person, place, and time. Mental status is at baseline.   Psychiatric:         Mood and Affect: Mood normal.         Behavior: Behavior normal.          Labs:   Lab Results   Component Value Date    WBC 7.90 01/04/2024    HGB 14.1 01/04/2024    HCT 43.9 01/04/2024    MCV 88.0 01/04/2024     (H) 01/04/2024       CMP  Sodium   Date Value Ref Range Status   01/04/2024 136 136 - 145 mmol/L Final     Potassium   Date Value Ref Range Status   01/04/2024 4.0 3.5 - 5.1 " mmol/L Final     CO2   Date Value Ref Range Status   01/04/2024 24 23 - 31 mmol/L Final     Blood Urea Nitrogen   Date Value Ref Range Status   01/04/2024 19.5 8.4 - 25.7 mg/dL Final     Creatinine   Date Value Ref Range Status   01/04/2024 1.01 0.73 - 1.18 mg/dL Final     Calcium   Date Value Ref Range Status   01/04/2024 9.0 8.8 - 10.0 mg/dL Final     Albumin   Date Value Ref Range Status   01/04/2024 4.1 3.4 - 4.8 g/dL Final     Bilirubin Total   Date Value Ref Range Status   01/04/2024 0.7 <=1.5 mg/dL Final     ALP   Date Value Ref Range Status   01/04/2024 130 40 - 150 unit/L Final     AST   Date Value Ref Range Status   01/04/2024 23 5 - 34 unit/L Final     ALT   Date Value Ref Range Status   01/04/2024 22 0 - 55 unit/L Final     eGFR   Date Value Ref Range Status   01/04/2024 >60 mls/min/1.73/m2 Final     Lab Results   Component Value Date    TSH 1.939 01/04/2024     Hep C Ab Interp   Date Value Ref Range Status   01/04/2024 Nonreactive Nonreactive Final     Syphilis Antibody   Date Value Ref Range Status   01/04/2024 Nonreactive Nonreactive, Equivocal Final     Cholesterol Total   Date Value Ref Range Status   01/04/2024 200 <=200 mg/dL Final     HDL Cholesterol   Date Value Ref Range Status   01/04/2024 61 (H) 35 - 60 mg/dL Final     Triglyceride   Date Value Ref Range Status   01/04/2024 74 34 - 140 mg/dL Final     Cholesterol/HDL Ratio   Date Value Ref Range Status   01/04/2024 3 0 - 5 Final     Very Low Density Lipoprotein   Date Value Ref Range Status   01/04/2024 15  Final     LDL Cholesterol   Date Value Ref Range Status   01/04/2024 124.00 50.00 - 140.00 mg/dL Final     Vitamin D   Date Value Ref Range Status   01/04/2024 34.1 30.0 - 80.0 ng/mL Final     Results for orders placed or performed in visit on 01/04/24   CD4 Lymphocytes   Result Value Ref Range    Patient Age 63     WBC Absolute 7,900 4,500 - 11,500 /mm3    Lymph Percent 48 28 - 48 %    Lymph Absolute 3,792 1,260 - 5,520 x10(3)/mcL    CD4  % 40.2 %    CD4 Absolute 1,524 (H) 589 - 1,505 unit/L    T Cell Interp       Normal total absolute lymphocyte count and mildly increased absolute CD4 lymphocyte count.    Blake Kaufman M.D.    Narrative    This test was developed and its performance characteristics determined by [facility]. It has not been cleared or approved by the US Food and Drug Administration. The FDA does not require this test to go through premarket FDA review. This test is used for clinical purposes. It should not be regarded as investigational or for research. This laboratory is certified under the Clinical Laboratory Improvement Amendments (CLIA) as qualified to perform high complexity clinical laboratory testing.     Results for orders placed or performed in visit on 01/04/24   HIV-1 RNA, Quantitative, PCR with Reflex to Genotype   Result Value Ref Range    HIV-1 RNA Detect/Quant, P <20 (A) Undetected copies/mL     Results for orders placed or performed in visit on 01/04/24   Quantiferon Gold TB   Result Value Ref Range    QuantiFERON-Tb Gold Plus Result Negative Negative    TB1 Ag minus Nil Result 0.00 IU/mL    TB2 Ag minus Nil Result -0.01 IU/mL    Mitogen minus Nil Result >10.00 IU/mL    Nil Result 0.03 IU/mL     No results found for this or any previous visit.  Results for orders placed or performed in visit on 01/04/24   Urinalysis   Result Value Ref Range    Color, UA Light-Yellow Yellow, Light-Yellow, Dark Yellow, Yelena, Straw    Appearance, UA Clear Clear    Specific Gravity, UA 1.015 1.005 - 1.030    pH, UA 5.0 5.0 - 8.5    Protein, UA Negative Negative    Glucose, UA Normal Negative, Normal    Ketones, UA Negative Negative    Blood, UA 1+ (A) Negative    Bilirubin, UA Negative Negative    Urobilinogen, UA Normal 0.2, 1.0, Normal    Nitrites, UA Negative Negative    Leukocyte Esterase,  (A) Negative    WBC, UA 21-50 (A) None Seen, 0-2, 3-5, 0-5 /HPF    Bacteria, UA Few (A) None Seen /HPF     Squamous Epithelial Cells, UA None Seen None Seen /HPF    Mucous, UA Trace (A) None Seen /LPF    Hyaline Casts, UA None Seen None Seen /lpf    RBC, UA 0-5 None Seen, 0-2, 3-5, 0-5 /HPF       Imaging: Reviewed most recent relevant imaging studies available, notable results highlighted in this note    Medications:     Current Outpatient Medications   Medication Instructions    atorvastatin (LIPITOR) 20 mg, Oral, Daily    wadntjoid-xvrgihic-yrsuodm ala (BIKTARVY) -25 mg (25 kg or greater) 1 tablet, Oral, Daily       Assessment:       Problem List Items Addressed This Visit          Cardiac/Vascular    Pure hypercholesterolemia    Relevant Medications    atorvastatin (LIPITOR) 20 MG tablet       ID    HIV disease - Primary    Relevant Medications    rkauszzfv-fhmaatqv-aquvhzt ala (BIKTARVY) -25 mg (25 kg or greater)    Other Relevant Orders    HIV-1 RNA, Quantitative, PCR with Reflex to Genotype    Comprehensive Metabolic Panel    Hepatitis B Surface Ab, Qualitative          Plan:      HIV disease  -     ubpfxrxfp-tudsgmoa-rhmsxma ala (BIKTARVY) -25 mg (25 kg or greater); Take 1 tablet by mouth once daily.  Dispense: 30 tablet; Refill: 4  -     HIV-1 RNA, Quantitative, PCR with Reflex to Genotype; Future; Expected date: 05/09/2024  -     Comprehensive Metabolic Panel; Future; Expected date: 05/09/2024  -     Hepatitis B Surface Ab, Qualitative; Future; Expected date: 05/09/2024  Adherence and sexual health counseling done.  Use condoms for all sexual encounters.  Blood precautions.   Discontinue Genvoya as directed.  Replace with Biktarvy 1 po daily.   Notify me for any concerns.  Labs today.  RTC 4 months with Ashley in clinic.     HIV Wellness:  Anal pap: 2019 NIL, no history of anal intercourse.   Oral CT/GC: 2019 neg  Anal CT/GC: 2019 neg  Urine CT/GC: 12/22 Neg, 1/24  neg  RPR: 12/22 NR, 1/24 NR  Ophth:  4/21  DEXA: 7/23 WNL  Colonoscopy: 7/2014 at [facility], 6/9/21 Dr. Lee, repeat in 5  yrs    Pure hypercholesterolemia  -     atorvastatin (LIPITOR) 20 MG tablet; Take 1 tablet (20 mg total) by mouth once daily.  Dispense: 90 tablet; Refill: 1  Decrease intake of fried & greasy foods.    Increase intake of Omega 3 rich foods in diet such as fatty fish, nuts, avocado, etc.  Avoid trans fat in diet, commonly found in packaged foods such as snacks/cakes/cookies.  Increase fiber intake.   Increase exercise to at least 30 minutes of moderate activity 3-5 days per week.  Continue atorvastatin as prescribed.

## 2024-05-10 LAB — HIV1 RNA # PLAS NAA DL=20: NORMAL COPIES/ML

## 2024-08-22 ENCOUNTER — TELEPHONE (OUTPATIENT)
Dept: INFECTIOUS DISEASES | Facility: CLINIC | Age: 64
End: 2024-08-22
Payer: MEDICARE

## 2024-08-22 NOTE — TELEPHONE ENCOUNTER
----- Message from Reid Gee sent at 8/22/2024  1:55 PM CDT -----  Patient of Ashley     Pt is wondering why his atorvastatin (LIPITOR) 20 MG tablet medication didn't get refilled this time .    Pt#[phone]    08/22/2024    @1:55

## 2024-08-22 NOTE — TELEPHONE ENCOUNTER
Phoned patient. Discussed Atorvastatin refill. Informed that rx with total of 6 month of refills sent to [facility] pharmacy in May 2024. Phoned [facility]. Verified rx active. Reactivated rx. Patient notified. Appreciative of call.

## 2024-09-12 ENCOUNTER — OFFICE VISIT (OUTPATIENT)
Dept: INFECTIOUS DISEASES | Facility: CLINIC | Age: 64
End: 2024-09-12
Payer: MEDICARE

## 2024-09-12 ENCOUNTER — LAB VISIT (OUTPATIENT)
Dept: LAB | Facility: HOSPITAL | Age: 64
End: 2024-09-12
Attending: NURSE PRACTITIONER
Payer: MEDICARE

## 2024-09-12 VITALS
WEIGHT: 152.25 LBS | DIASTOLIC BLOOD PRESSURE: 74 MMHG | HEART RATE: 78 BPM | HEIGHT: 66 IN | SYSTOLIC BLOOD PRESSURE: 123 MMHG | TEMPERATURE: 98 F | BODY MASS INDEX: 24.47 KG/M2 | RESPIRATION RATE: 12 BRPM

## 2024-09-12 DIAGNOSIS — B20 HIV DISEASE: Primary | ICD-10-CM

## 2024-09-12 DIAGNOSIS — B20 HIV DISEASE: ICD-10-CM

## 2024-09-12 LAB
ALBUMIN SERPL-MCNC: 3.9 G/DL (ref 3.4–4.8)
ALBUMIN/GLOB SERPL: 1.2 RATIO (ref 1.1–2)
ALP SERPL-CCNC: 104 UNIT/L (ref 40–150)
ALT SERPL-CCNC: 21 UNIT/L (ref 0–55)
ANION GAP SERPL CALC-SCNC: 6 MEQ/L
AST SERPL-CCNC: 21 UNIT/L (ref 5–34)
BASOPHILS # BLD AUTO: 0.06 X10(3)/MCL
BASOPHILS NFR BLD AUTO: 0.7 %
BILIRUB SERPL-MCNC: 0.8 MG/DL
BUN SERPL-MCNC: 11.2 MG/DL (ref 8.4–25.7)
CALCIUM SERPL-MCNC: 9.1 MG/DL (ref 8.8–10)
CHLORIDE SERPL-SCNC: 108 MMOL/L (ref 98–107)
CO2 SERPL-SCNC: 23 MMOL/L (ref 23–31)
CREAT SERPL-MCNC: 0.76 MG/DL (ref 0.73–1.18)
CREAT/UREA NIT SERPL: 15
EOSINOPHIL # BLD AUTO: 0.34 X10(3)/MCL (ref 0–0.9)
EOSINOPHIL NFR BLD AUTO: 4 %
ERYTHROCYTE [DISTWIDTH] IN BLOOD BY AUTOMATED COUNT: 13.9 % (ref 11.5–17)
GFR SERPLBLD CREATININE-BSD FMLA CKD-EPI: >60 ML/MIN/1.73/M2
GLOBULIN SER-MCNC: 3.2 GM/DL (ref 2.4–3.5)
GLUCOSE SERPL-MCNC: 107 MG/DL (ref 82–115)
HCT VFR BLD AUTO: 43.9 % (ref 42–52)
HGB BLD-MCNC: 14.9 G/DL (ref 14–18)
IMM GRANULOCYTES # BLD AUTO: 0.02 X10(3)/MCL (ref 0–0.04)
IMM GRANULOCYTES NFR BLD AUTO: 0.2 %
LYMPHOCYTES # BLD AUTO: 3.61 X10(3)/MCL (ref 0.6–4.6)
LYMPHOCYTES NFR BLD AUTO: 42.6 %
MCH RBC QN AUTO: 30.5 PG (ref 27–31)
MCHC RBC AUTO-ENTMCNC: 33.9 G/DL (ref 33–36)
MCV RBC AUTO: 89.8 FL (ref 80–94)
MONOCYTES # BLD AUTO: 0.84 X10(3)/MCL (ref 0.1–1.3)
MONOCYTES NFR BLD AUTO: 9.9 %
NEUTROPHILS # BLD AUTO: 3.6 X10(3)/MCL (ref 2.1–9.2)
NEUTROPHILS NFR BLD AUTO: 42.6 %
NRBC BLD AUTO-RTO: 0 %
PLATELET # BLD AUTO: 401 X10(3)/MCL (ref 130–400)
PMV BLD AUTO: 9.5 FL (ref 7.4–10.4)
POTASSIUM SERPL-SCNC: 3.8 MMOL/L (ref 3.5–5.1)
PROT SERPL-MCNC: 7.1 GM/DL (ref 5.8–7.6)
RBC # BLD AUTO: 4.89 X10(6)/MCL (ref 4.7–6.1)
SODIUM SERPL-SCNC: 137 MMOL/L (ref 136–145)
WBC # BLD AUTO: 8.47 X10(3)/MCL (ref 4.5–11.5)

## 2024-09-12 PROCEDURE — 85025 COMPLETE CBC W/AUTO DIFF WBC: CPT

## 2024-09-12 PROCEDURE — 99213 OFFICE O/P EST LOW 20 MIN: CPT | Mod: PBBFAC | Performed by: NURSE PRACTITIONER

## 2024-09-12 PROCEDURE — 87536 HIV-1 QUANT&REVRSE TRNSCRPJ: CPT

## 2024-09-12 PROCEDURE — 36415 COLL VENOUS BLD VENIPUNCTURE: CPT

## 2024-09-12 PROCEDURE — 86360 T CELL ABSOLUTE COUNT/RATIO: CPT

## 2024-09-12 PROCEDURE — 80053 COMPREHEN METABOLIC PANEL: CPT

## 2024-09-12 RX ORDER — BICTEGRAVIR SODIUM, EMTRICITABINE, AND TENOFOVIR ALAFENAMIDE FUMARATE 50; 200; 25 MG/1; MG/1; MG/1
1 TABLET ORAL DAILY
Qty: 30 TABLET | Refills: 4 | Status: SHIPPED | OUTPATIENT
Start: 2024-09-12

## 2024-09-12 NOTE — PROGRESS NOTES
Patient ID: Chad Richardson 64 y.o.     Chief Complaint:   Chief Complaint   Patient presents with    Followup HIV     Denies problems        HPI:    9/12/24  Chad is a 63 yo WM here today for HIV f/u visit.  He is tolerating Biktarvy well but has noted an increase in fatigue over the past couple of months. May be associated with summer heat though, will continue Biktarvy and re-evaluate at next visit. Voiced appreciation. He tells me that he had about 1.5 week interruption in atorvastatin due to pharmacy not filling on time. Has since resumed & is tolerating well. Cholesterol well controlled. Labs 5/24 VL UD, 1/24 CD4 1524.  He has no questions or concerns today.     5/9/24  Chad is a 63 yo WM presenting today for HIV f/u visit. He is virally suppressed on Genvoya, tolerates well. Labs 1/24 VL <20, CD4 1524, STI screenings negative. Discussed recommendation for enhancer free ART with option to change to Biktarvy. He tells me that his insurance has been recommending this as well for preferred medication. Amenable to ART revision as  discussed. He tells me that he is feeling fine from a urinary standpoint and did complete Macrobid as prescribed. Cholesterol well controlled. All questions answered & concerns addressed.     1/4/24  Chad is a 64 yo WM here today for HIV f/u visit.  He is taking Genvoya daily with viral suppression. Labs 8/23 VL UD.  DEXA completed 7/23, WNL. Remains in care with PCP Dr. Kim, routine annual screening labs as recommended. He is taking atorvastatin daily for HLD, fasting lipids today.  He completed 1st dose of Shingrix last visit, did have some body aches, fatigue, and chills. He is amenable to 2nd dose today, will take analgesic today for symptom management. Also amenable to flu vaccine today. He is doing well overall & has no concerns or questions today.           Past Medical History:   Diagnosis Date    Below knee amputation     HIV infection     Hyperlipidemia          Past Surgical History:   Procedure Laterality Date    BELOW KNEE AMPUTATION OF LOWER EXTREMITY      COLONOSCOPY  2021    Dr Felix Lee    SPLENECTOMY, TOTAL          Social History     Socioeconomic History    Marital status: Single   Tobacco Use    Smoking status: Former     Current packs/day: 0.25     Average packs/day: 1 pack/day for 54.7 years (54.3 ttl pk-yrs)     Types: Cigarettes     Start date:     Smokeless tobacco: Never   Substance and Sexual Activity    Alcohol use: Yes     Alcohol/week: 7.0 standard drinks of alcohol     Types: 7 Cans of beer per week     Comment: daily    Drug use: Not Currently    Sexual activity: Not Currently     Partners: Female     Birth control/protection: Condom     Comment: States s/o         Family History   Problem Relation Name Age of Onset    Diabetes Mother      Diabetes Sister      Diabetes Brother          Review of patient's allergies indicates:  No Known Allergies     Immunization History   Administered Date(s) Administered    Hepatitis A, Adult 2012    Hepatitis B, Adult 2014    Influenza 10/11/2013    Influenza (FLUAD) - Quadrivalent - Adjuvanted - PF *Preferred* (65+) 2024    Influenza - Quadrivalent 10/12/2016, 10/30/2017, 2019, 2020, 12/15/2021    Influenza - Quadrivalent - PF (6-35 months) 10/30/2017, 2018    Influenza - Quadrivalent - PF *Preferred* (6 months and older) 10/30/2017, 2019, 2022    Influenza - Trivalent - PF (ADULT) 10/11/2013, 10/16/2014, 10/27/2015, 10/30/2017    MMR 2015    Meningococcal Conjugate (MCV4P) 2018, 2018    Pneumococcal Conjugate - 13 Valent 2014, 01/15/2020    Pneumococcal Conjugate - 20 Valent 2023    Pneumococcal Polysaccharide - 23 Valent 2012, 2018    Tdap 2014    Zoster Recombinant 2023, 2024        Review of Systems   Constitutional: Negative.    HENT: Negative.     Eyes: Negative.   "  Respiratory: Negative.     Cardiovascular: Negative.    Gastrointestinal: Negative.    Genitourinary: Negative.    Musculoskeletal: Negative.    Skin: Negative.    Neurological: Negative.    Endo/Heme/Allergies: Negative.    Psychiatric/Behavioral: Negative.     All other systems reviewed and are negative.         Objective:      /74 (BP Location: Left arm, Patient Position: Sitting, BP Method: Medium (Automatic))   Pulse 78   Temp 97.8 °F (36.6 °C) (Oral)   Resp 12   Ht 5' 6" (1.676 m)   Wt 69 kg (152 lb 3.6 oz)   BMI 24.57 kg/m²      Physical Exam  Vitals reviewed.   Constitutional:       General: He is not in acute distress.     Appearance: Normal appearance. He is not toxic-appearing.   HENT:      Mouth/Throat:      Mouth: Mucous membranes are moist.      Pharynx: Oropharynx is clear.   Eyes:      Conjunctiva/sclera: Conjunctivae normal.   Cardiovascular:      Rate and Rhythm: Normal rate and regular rhythm.   Pulmonary:      Effort: Pulmonary effort is normal. No respiratory distress.      Breath sounds: Normal breath sounds.   Abdominal:      General: Abdomen is flat. Bowel sounds are normal.      Palpations: Abdomen is soft.   Musculoskeletal:         General: Normal range of motion.      Cervical back: Normal range of motion.   Lymphadenopathy:      Cervical: No cervical adenopathy.   Skin:     General: Skin is warm and dry.   Neurological:      General: No focal deficit present.      Mental Status: He is alert and oriented to person, place, and time. Mental status is at baseline.   Psychiatric:         Mood and Affect: Mood normal.         Behavior: Behavior normal.          Labs:   Lab Results   Component Value Date    WBC 7.90 01/04/2024    HGB 14.1 01/04/2024    HCT 43.9 01/04/2024    MCV 88.0 01/04/2024     (H) 01/04/2024       CMP  Sodium   Date Value Ref Range Status   05/09/2024 141 136 - 145 mmol/L Final     Potassium   Date Value Ref Range Status   05/09/2024 4.3 3.5 - 5.1 " mmol/L Final     Chloride   Date Value Ref Range Status   05/09/2024 109 (H) 98 - 107 mmol/L Final     CO2   Date Value Ref Range Status   05/09/2024 24 23 - 31 mmol/L Final     Blood Urea Nitrogen   Date Value Ref Range Status   05/09/2024 11.3 8.4 - 25.7 mg/dL Final     Creatinine   Date Value Ref Range Status   05/09/2024 0.87 0.73 - 1.18 mg/dL Final     Calcium   Date Value Ref Range Status   05/09/2024 9.3 8.8 - 10.0 mg/dL Final     Albumin   Date Value Ref Range Status   05/09/2024 4.1 3.4 - 4.8 g/dL Final     Bilirubin Total   Date Value Ref Range Status   05/09/2024 0.6 <=1.5 mg/dL Final     ALP   Date Value Ref Range Status   05/09/2024 93 40 - 150 unit/L Final     AST   Date Value Ref Range Status   05/09/2024 19 5 - 34 unit/L Final     ALT   Date Value Ref Range Status   05/09/2024 20 0 - 55 unit/L Final     eGFR   Date Value Ref Range Status   05/09/2024 >60 mL/min/1.73/m2 Final     Lab Results   Component Value Date    TSH 1.939 01/04/2024     Hep C Ab Interp   Date Value Ref Range Status   01/04/2024 Nonreactive Nonreactive Final     Syphilis Antibody   Date Value Ref Range Status   01/04/2024 Nonreactive Nonreactive, Equivocal Final     Cholesterol Total   Date Value Ref Range Status   01/04/2024 200 <=200 mg/dL Final     HDL Cholesterol   Date Value Ref Range Status   01/04/2024 61 (H) 35 - 60 mg/dL Final     Triglyceride   Date Value Ref Range Status   01/04/2024 74 34 - 140 mg/dL Final     Cholesterol/HDL Ratio   Date Value Ref Range Status   01/04/2024 3 0 - 5 Final     Very Low Density Lipoprotein   Date Value Ref Range Status   01/04/2024 15  Final     LDL Cholesterol   Date Value Ref Range Status   01/04/2024 124.00 50.00 - 140.00 mg/dL Final     Vitamin D   Date Value Ref Range Status   01/04/2024 34.1 30.0 - 80.0 ng/mL Final     Results for orders placed or performed in visit on 01/04/24   CD4 Lymphocytes   Result Value Ref Range    Patient Age 63     WBC Absolute 7,900 4,500 - 11,500 /mm3     Lymph Percent 48 28 - 48 %    Lymph Absolute 3,792 1,260 - 5,520 x10(3)/mcL    CD4 % 40.2 %    CD4 Absolute 1,524 (H) 589 - 1,505 unit/L    T Cell Interp       Normal total absolute lymphocyte count and mildly increased absolute CD4 lymphocyte count.    Blake Kaufman M.D.    Narrative    This test was developed and its performance characteristics determined by [facility]. It has not been cleared or approved by the US Food and Drug Administration. The FDA does not require this test to go through premarket FDA review. This test is used for clinical purposes. It should not be regarded as investigational or for research. This laboratory is certified under the Clinical Laboratory Improvement Amendments (CLIA) as qualified to perform high complexity clinical laboratory testing.     Results for orders placed or performed in visit on 05/09/24   HIV-1 RNA, Quantitative, PCR with Reflex to Genotype   Result Value Ref Range    HIV-1 RNA Detect/Quant, P Undetected Undetected copies/mL     Results for orders placed or performed in visit on 01/04/24   Quantiferon Gold TB   Result Value Ref Range    QuantiFERON-Tb Gold Plus Result Negative Negative    TB1 Ag minus Nil Result 0.00 IU/mL    TB2 Ag minus Nil Result -0.01 IU/mL    Mitogen minus Nil Result >10.00 IU/mL    Nil Result 0.03 IU/mL     No results found for this or any previous visit.  Results for orders placed or performed in visit on 01/04/24   Urinalysis   Result Value Ref Range    Color, UA Light-Yellow Yellow, Light-Yellow, Dark Yellow, Yelena, Straw    Appearance, UA Clear Clear    Specific Gravity, UA 1.015 1.005 - 1.030    pH, UA 5.0 5.0 - 8.5    Protein, UA Negative Negative    Glucose, UA Normal Negative, Normal    Ketones, UA Negative Negative    Blood, UA 1+ (A) Negative    Bilirubin, UA Negative Negative    Urobilinogen, UA Normal 0.2, 1.0, Normal    Nitrites, UA Negative Negative    Leukocyte Esterase,  (A) Negative    WBC, UA  21-50 (A) None Seen, 0-2, 3-5, 0-5 /HPF    Bacteria, UA Few (A) None Seen /HPF    Squamous Epithelial Cells, UA None Seen None Seen /HPF    Mucous, UA Trace (A) None Seen /LPF    Hyaline Casts, UA None Seen None Seen /lpf    RBC, UA 0-5 None Seen, 0-2, 3-5, 0-5 /HPF       Imaging: Reviewed most recent relevant imaging studies available, notable results highlighted in this note    Medications:     Current Outpatient Medications   Medication Instructions    atorvastatin (LIPITOR) 20 mg, Oral, Daily    wumulrjbm-rbnngwbk-carrypi ala (BIKTARVY) -25 mg (25 kg or greater) 1 tablet, Oral, Daily       Assessment:       Problem List Items Addressed This Visit          ID    HIV disease - Primary    Relevant Medications    olvswwihu-makjqcmz-aayibav ala (BIKTARVY) -25 mg (25 kg or greater)    Other Relevant Orders    CD4 T-Ney Cells    Comprehensive Metabolic Panel    CBC Auto Differential    HIV-1 RNA, Quantitative, PCR with Reflex to Genotype          Plan:      HIV disease  -     bpfwchgcm-kzdetmwp-swdvutp ala (BIKTARVY) -25 mg (25 kg or greater); Take 1 tablet by mouth once daily.  Dispense: 30 tablet; Refill: 4  -     CD4 T-Ney Cells; Future; Expected date: 09/12/2024  -     Comprehensive Metabolic Panel; Future; Expected date: 09/12/2024  -     CBC Auto Differential; Future; Expected date: 09/12/2024  -     HIV-1 RNA, Quantitative, PCR with Reflex to Genotype; Future; Expected date: 09/12/2024  Adherence and sexual health counseling done.  Use condoms for all sexual encounters.  Blood precautions.   Continue Biktarvy 1 po daily.   Labs today.  RTC 4 months with Ashley, in clinic.     HIV Wellness:  Anal pap: 2019 NIL, no history of anal intercourse.   Oral CT/GC: 2019 neg  Anal CT/GC: 2019 neg  Urine CT/GC: 12/22 Neg, 1/24  neg  RPR: 12/22 NR, 1/24 NR  Ophth:  4/21  DEXA: 7/23 WNL  [facility] 6/[facility]/21 Dr. Lee, repeat in 5 yrs       Pure hypercholesterolemia  Decrease  intake of fried & greasy foods.    Increase intake of Omega 3 rich foods in diet such as fatty fish, nuts, avocado, etc.  Avoid trans fat in diet, commonly found in packaged foods such as snacks/cakes/cookies.  Increase fiber intake.   Increase exercise to at least 30 minutes of moderate activity 3-5 days per week.  Continue atorvastatin as prescribed.

## 2024-09-13 LAB
CD3 CELLS # BLD: 2268 CELLS/MCL (ref 550–2202)
CD3 CELLS NFR BLD: 63 % (ref 58–86)
CD3+CD4+ CELLS # BLD: 1252 CELLS/MCL (ref 365–1437)
CD3+CD4+ CELLS NFR BLD: 35 % (ref 32–64)
CD3+CD4+ CELLS/CD3+CD8+ CLL BLD: 1.3 %
CD3+CD8+ CELLS # BLD: 1004 CELLS/MCL (ref 80–846)
CD3+CD8+ CELLS NFR BLD: 28 % (ref 8–40)
CD45 CELLS # BLD: 3.59 THOU/MCL (ref 0.82–2.84)
HIV1 RNA # PLAS NAA DL=20: 76 COPIES/ML

## 2024-09-16 ENCOUNTER — TELEPHONE (OUTPATIENT)
Dept: INFECTIOUS DISEASES | Facility: CLINIC | Age: 64
End: 2024-09-16
Payer: MEDICARE

## 2024-09-16 NOTE — TELEPHONE ENCOUNTER
Viral load increased to 76 copies. Please phone pt with results and emphasize importance of taking every day without any missed doses. Thank you.

## 2024-09-16 NOTE — LETTER
September 17, 2024    Chad Richardson  [address] [address]             [facility]  [address] [address]  Phone: [phone] Dear Mr. Chad Richardson:      Good Day, this is Earlene at [facility]. I have been trying to reach you regarding your results. Please give us a call regarding the results and this is also a friendly reminder to take your medication daily, with food if possible and at approximately the same time each day. You can reach us at [phone].       Sincerely,        Earlene Martin LPN

## 2024-09-17 NOTE — TELEPHONE ENCOUNTER
Addended by: BEVERLY NARVAEZ on: 2/10/2022 09:37 AM     Modules accepted: Orders     Patient contacted the clinic back. Discussed Viral load increased to 76 copies. Emphasized importance of taking every day without any missed doses. Voiced understanding and appreciates call.

## 2025-01-13 ENCOUNTER — OFFICE VISIT (OUTPATIENT)
Dept: INFECTIOUS DISEASES | Facility: CLINIC | Age: 65
End: 2025-01-13
Payer: MEDICARE

## 2025-01-13 ENCOUNTER — LAB VISIT (OUTPATIENT)
Dept: LAB | Facility: HOSPITAL | Age: 65
End: 2025-01-13
Attending: NURSE PRACTITIONER
Payer: MEDICARE

## 2025-01-13 VITALS
WEIGHT: 155.63 LBS | DIASTOLIC BLOOD PRESSURE: 74 MMHG | HEIGHT: 66 IN | BODY MASS INDEX: 25.01 KG/M2 | TEMPERATURE: 98 F | HEART RATE: 94 BPM | SYSTOLIC BLOOD PRESSURE: 132 MMHG | RESPIRATION RATE: 12 BRPM

## 2025-01-13 DIAGNOSIS — E78.00 PURE HYPERCHOLESTEROLEMIA: ICD-10-CM

## 2025-01-13 DIAGNOSIS — Z23 NEED FOR VACCINATION: ICD-10-CM

## 2025-01-13 DIAGNOSIS — B20 HIV DISEASE: Primary | ICD-10-CM

## 2025-01-13 DIAGNOSIS — R79.9 ABNORMAL FINDING OF BLOOD CHEMISTRY, UNSPECIFIED: ICD-10-CM

## 2025-01-13 DIAGNOSIS — R68.89 OTHER GENERAL SYMPTOMS AND SIGNS: ICD-10-CM

## 2025-01-13 DIAGNOSIS — E55.9 VITAMIN D DEFICIENCY, UNSPECIFIED: ICD-10-CM

## 2025-01-13 DIAGNOSIS — B20 HIV DISEASE: ICD-10-CM

## 2025-01-13 LAB
25(OH)D3+25(OH)D2 SERPL-MCNC: 33 NG/ML (ref 30–80)
ALBUMIN SERPL-MCNC: 4.2 G/DL (ref 3.4–4.8)
ALBUMIN/GLOB SERPL: 1.1 RATIO (ref 1.1–2)
ALP SERPL-CCNC: 121 UNIT/L (ref 40–150)
ALT SERPL-CCNC: 28 UNIT/L (ref 0–55)
ANION GAP SERPL CALC-SCNC: 8 MEQ/L
AST SERPL-CCNC: 29 UNIT/L (ref 5–34)
BACTERIA #/AREA URNS AUTO: ABNORMAL /HPF
BASOPHILS # BLD AUTO: 0.07 X10(3)/MCL
BASOPHILS NFR BLD AUTO: 0.9 %
BILIRUB SERPL-MCNC: 0.6 MG/DL
BILIRUB UR QL STRIP.AUTO: NEGATIVE
BUN SERPL-MCNC: 12.3 MG/DL (ref 8.4–25.7)
C TRACH DNA SPEC QL NAA+PROBE: NOT DETECTED
CALCIUM SERPL-MCNC: 9.2 MG/DL (ref 8.8–10)
CHLORIDE SERPL-SCNC: 110 MMOL/L (ref 98–107)
CHOLEST SERPL-MCNC: 154 MG/DL
CHOLEST/HDLC SERPL: 3 {RATIO} (ref 0–5)
CLARITY UR: CLEAR
CO2 SERPL-SCNC: 22 MMOL/L (ref 23–31)
COLOR UR AUTO: COLORLESS
CREAT SERPL-MCNC: 0.8 MG/DL (ref 0.72–1.25)
CREAT/UREA NIT SERPL: 15
EOSINOPHIL # BLD AUTO: 0.29 X10(3)/MCL (ref 0–0.9)
EOSINOPHIL NFR BLD AUTO: 3.6 %
ERYTHROCYTE [DISTWIDTH] IN BLOOD BY AUTOMATED COUNT: 13.7 % (ref 11.5–17)
EST. AVERAGE GLUCOSE BLD GHB EST-MCNC: 114 MG/DL
GFR SERPLBLD CREATININE-BSD FMLA CKD-EPI: >60 ML/MIN/1.73/M2
GLOBULIN SER-MCNC: 3.7 GM/DL (ref 2.4–3.5)
GLUCOSE SERPL-MCNC: 86 MG/DL (ref 82–115)
GLUCOSE UR QL STRIP: NORMAL
HAV AB SER QL IA: REACTIVE
HBA1C MFR BLD: 5.6 %
HBV SURFACE AB SER-ACNC: 354.31 MIU/ML
HBV SURFACE AB SERPL IA-ACNC: REACTIVE M[IU]/ML
HCT VFR BLD AUTO: 44 % (ref 42–52)
HCV AB SERPL QL IA: NONREACTIVE
HDLC SERPL-MCNC: 57 MG/DL (ref 35–60)
HGB BLD-MCNC: 14.7 G/DL (ref 14–18)
HGB UR QL STRIP: NEGATIVE
HYALINE CASTS #/AREA URNS LPF: ABNORMAL /LPF
IMM GRANULOCYTES # BLD AUTO: 0.02 X10(3)/MCL (ref 0–0.04)
IMM GRANULOCYTES NFR BLD AUTO: 0.2 %
KETONES UR QL STRIP: NEGATIVE
LDLC SERPL CALC-MCNC: 82 MG/DL (ref 50–140)
LEUKOCYTE ESTERASE UR QL STRIP: NEGATIVE
LYMPHOCYTES # BLD AUTO: 4.39 X10(3)/MCL (ref 0.6–4.6)
LYMPHOCYTES NFR BLD AUTO: 54.1 %
MCH RBC QN AUTO: 29.4 PG (ref 27–31)
MCHC RBC AUTO-ENTMCNC: 33.4 G/DL (ref 33–36)
MCV RBC AUTO: 88 FL (ref 80–94)
MONOCYTES # BLD AUTO: 0.58 X10(3)/MCL (ref 0.1–1.3)
MONOCYTES NFR BLD AUTO: 7.1 %
MUCOUS THREADS URNS QL MICRO: ABNORMAL /LPF
N GONORRHOEA DNA SPEC QL NAA+PROBE: NOT DETECTED
NEUTROPHILS # BLD AUTO: 2.77 X10(3)/MCL (ref 2.1–9.2)
NEUTROPHILS NFR BLD AUTO: 34.1 %
NITRITE UR QL STRIP: NEGATIVE
NRBC BLD AUTO-RTO: 0 %
PH UR STRIP: 5 [PH]
PLATELET # BLD AUTO: 472 X10(3)/MCL (ref 130–400)
PMV BLD AUTO: 9.2 FL (ref 7.4–10.4)
POTASSIUM SERPL-SCNC: 4.2 MMOL/L (ref 3.5–5.1)
PROT SERPL-MCNC: 7.9 GM/DL (ref 5.8–7.6)
PROT UR QL STRIP: NEGATIVE
RBC # BLD AUTO: 5 X10(6)/MCL (ref 4.7–6.1)
RBC #/AREA URNS AUTO: ABNORMAL /HPF
SODIUM SERPL-SCNC: 140 MMOL/L (ref 136–145)
SOURCE (OHS): NORMAL
SP GR UR STRIP.AUTO: <1.005 (ref 1–1.03)
SQUAMOUS #/AREA URNS LPF: ABNORMAL /HPF
T PALLIDUM AB SER QL: NONREACTIVE
TRIGL SERPL-MCNC: 75 MG/DL (ref 34–140)
TSH SERPL-ACNC: 2.39 UIU/ML (ref 0.35–4.94)
UROBILINOGEN UR STRIP-ACNC: NORMAL
VLDLC SERPL CALC-MCNC: 15 MG/DL
WBC # BLD AUTO: 8.12 X10(3)/MCL (ref 4.5–11.5)
WBC #/AREA URNS AUTO: ABNORMAL /HPF

## 2025-01-13 PROCEDURE — 82306 VITAMIN D 25 HYDROXY: CPT

## 2025-01-13 PROCEDURE — 85025 COMPLETE CBC W/AUTO DIFF WBC: CPT

## 2025-01-13 PROCEDURE — 83036 HEMOGLOBIN GLYCOSYLATED A1C: CPT

## 2025-01-13 PROCEDURE — 80053 COMPREHEN METABOLIC PANEL: CPT

## 2025-01-13 PROCEDURE — 99214 OFFICE O/P EST MOD 30 MIN: CPT | Mod: S$PBB,,, | Performed by: NURSE PRACTITIONER

## 2025-01-13 PROCEDURE — 36415 COLL VENOUS BLD VENIPUNCTURE: CPT

## 2025-01-13 PROCEDURE — 90653 IIV ADJUVANT VACCINE IM: CPT | Mod: PBBFAC

## 2025-01-13 PROCEDURE — 87536 HIV-1 QUANT&REVRSE TRNSCRPJ: CPT

## 2025-01-13 PROCEDURE — 80061 LIPID PANEL: CPT

## 2025-01-13 PROCEDURE — 86361 T CELL ABSOLUTE COUNT: CPT

## 2025-01-13 PROCEDURE — 86780 TREPONEMA PALLIDUM: CPT

## 2025-01-13 PROCEDURE — 86480 TB TEST CELL IMMUN MEASURE: CPT

## 2025-01-13 PROCEDURE — 87591 N.GONORRHOEAE DNA AMP PROB: CPT | Performed by: NURSE PRACTITIONER

## 2025-01-13 PROCEDURE — 81001 URINALYSIS AUTO W/SCOPE: CPT | Performed by: NURSE PRACTITIONER

## 2025-01-13 PROCEDURE — 84443 ASSAY THYROID STIM HORMONE: CPT

## 2025-01-13 PROCEDURE — G0008 ADMIN INFLUENZA VIRUS VAC: HCPCS | Mod: PBBFAC

## 2025-01-13 PROCEDURE — 90715 TDAP VACCINE 7 YRS/> IM: CPT | Mod: PBBFAC

## 2025-01-13 PROCEDURE — 86706 HEP B SURFACE ANTIBODY: CPT

## 2025-01-13 PROCEDURE — 90472 IMMUNIZATION ADMIN EACH ADD: CPT | Mod: PBBFAC

## 2025-01-13 PROCEDURE — 86708 HEPATITIS A ANTIBODY: CPT

## 2025-01-13 PROCEDURE — 99214 OFFICE O/P EST MOD 30 MIN: CPT | Mod: PBBFAC,25 | Performed by: NURSE PRACTITIONER

## 2025-01-13 PROCEDURE — 86803 HEPATITIS C AB TEST: CPT

## 2025-01-13 RX ORDER — ATORVASTATIN CALCIUM 20 MG/1
20 TABLET, FILM COATED ORAL DAILY
Qty: 90 TABLET | Refills: 1 | Status: SHIPPED | OUTPATIENT
Start: 2025-01-13

## 2025-01-13 RX ORDER — BICTEGRAVIR SODIUM, EMTRICITABINE, AND TENOFOVIR ALAFENAMIDE FUMARATE 50; 200; 25 MG/1; MG/1; MG/1
1 TABLET ORAL DAILY
Qty: 30 TABLET | Refills: 4 | Status: SHIPPED | OUTPATIENT
Start: 2025-01-13

## 2025-01-13 RX ADMIN — TETANUS TOXOID, REDUCED DIPHTHERIA TOXOID AND ACELLULAR PERTUSSIS VACCINE, ADSORBED 0.5 ML: 5; 2.5; 8; 8; 2.5 SUSPENSION INTRAMUSCULAR at 09:01

## 2025-01-13 RX ADMIN — INFLUENZA A VIRUS A/VICTORIA/4897/2022 IVR-238 (H1N1) ANTIGEN (FORMALDEHYDE INACTIVATED), INFLUENZA A VIRUS A/THAILAND/8/2022 IVR-237 (H3N2) ANTIGEN (FORMALDEHYDE INACTIVATED), INFLUENZA B VIRUS B/AUSTRIA/1359417/2021 BVR-26 ANTIGEN (FORMALDEHYDE INACTIVATED) 0.5 ML: 15; 15; 15 INJECTION, SUSPENSION INTRAMUSCULAR at 09:01

## 2025-01-13 NOTE — PROGRESS NOTES
Boostrix and Influenza Vaccination given IM left deltoid using aseptic tech. Patient tolerated well. To contact clinic prn.

## 2025-01-13 NOTE — PROGRESS NOTES
"Patient ID: Chad Richardson 64 y.o.     Chief Complaint:   Chief Complaint   Patient presents with    Followup HIV     Denies problems        HPI:    1/13/25  Chad is a 63 yo WM presenting today for HIV f/u visit.  He takes Biktarvy daily but does admit that he was "slipping" around time of last visit. He has improved adherence since. Labs 9/24 VL 76, Cd4 1252.  He continues with atorvastatin daily, tolerates well. Will check lipid panel today. Appreciates flu & TDAP vaccines as recommended today. All questions answered & concerns addressed.    9/12/24  Chad is a 63 yo WM here today for HIV f/u visit.  He is tolerating Biktarvy well but has noted an increase in fatigue over the past couple of months. May be associated with summer heat though, will continue Biktarvy and re-evaluate at next visit. Voiced appreciation. He tells me that he had about 1.5 week interruption in atorvastatin due to pharmacy not filling on time. Has since resumed & is tolerating well. Cholesterol well controlled. Labs 5/24 VL UD, 1/24 CD4 1524.  He has no questions or concerns today.      5/9/24  Chad is a 63 yo WM presenting today for HIV f/u visit. He is virally suppressed on Genvoya, tolerates well. Labs 1/24 VL <20, CD4 1524, STI screenings negative. Discussed recommendation for enhancer free ART with option to change to Biktarvy. He tells me that his insurance has been recommending this as well for preferred medication. Amenable to ART revision as  discussed. He tells me that he is feeling fine from a urinary standpoint and did complete Macrobid as prescribed. Cholesterol well controlled. All questions answered & concerns addressed.           Past Medical History:   Diagnosis Date    Below knee amputation     HIV infection     Hyperlipidemia         Past Surgical History:   Procedure Laterality Date    BELOW KNEE AMPUTATION OF LOWER EXTREMITY      COLONOSCOPY  06/09/2021    Dr Felix Lee    SPLENECTOMY, TOTAL      "     Social History     Socioeconomic History    Marital status: Single   Tobacco Use    Smoking status: Former     Current packs/day: 0.25     Average packs/day: 1 pack/day for 55.0 years (54.4 ttl pk-yrs)     Types: Cigarettes     Start date:     Smokeless tobacco: Never   Substance and Sexual Activity    Alcohol use: Yes     Alcohol/week: 7.0 standard drinks of alcohol     Types: 7 Cans of beer per week     Comment: daily    Drug use: Not Currently    Sexual activity: Not Currently     Partners: Female     Birth control/protection: Condom     Comment: States s/o         Family History   Problem Relation Name Age of Onset    Diabetes Mother      Diabetes Sister      Diabetes Brother          Review of patient's allergies indicates:  No Known Allergies     Immunization History   Administered Date(s) Administered    Hepatitis A, Adult 2012    Hepatitis B, Adult 2014    Influenza 10/11/2013    Influenza (FLUAD) - Quadrivalent - Adjuvanted - PF *Preferred* (65+) 2024    Influenza - Quadrivalent 10/12/2016, 10/30/2017, 2019, 2020, 12/15/2021    Influenza - Quadrivalent - PF (6-35 months) 10/30/2017, 2018    Influenza - Quadrivalent - PF *Preferred* (6 months and older) 10/30/2017, 2019, 2022    Influenza - Trivalent - Fluad - Adjuvanted - PF (65 years and older 2025    Influenza - Trivalent - Fluarix, Flulaval, Fluzone, Afluria - PF 10/11/2013, 10/16/2014, 10/27/2015, 10/30/2017    MMR 2015    Meningococcal Conjugate (MCV4P) 2018, 2018    Pneumococcal Conjugate - 13 Valent 2014, 01/15/2020    Pneumococcal Conjugate - 20 Valent 2023    Pneumococcal Polysaccharide - 23 Valent 2012, 2018    Tdap 2014, 2025    Zoster Recombinant 2023, 2024        Review of Systems   Constitutional: Negative.    HENT: Negative.     Eyes: Negative.    Respiratory: Negative.     Cardiovascular: Negative.   "  Gastrointestinal: Negative.    Genitourinary: Negative.    Musculoskeletal: Negative.    Skin: Negative.    Neurological: Negative.    Endo/Heme/Allergies: Negative.    Psychiatric/Behavioral: Negative.     All other systems reviewed and are negative.         Objective:      /74 (BP Location: Left arm, Patient Position: Sitting)   Pulse 94   Temp 97.5 °F (36.4 °C) (Oral)   Resp 12   Ht 5' 6" (1.676 m)   Wt 70.6 kg (155 lb 10.3 oz)   BMI 25.12 kg/m²      Physical Exam  Vitals reviewed.   Constitutional:       General: He is not in acute distress.     Appearance: Normal appearance. He is not toxic-appearing.   HENT:      Mouth/Throat:      Mouth: Mucous membranes are moist.      Pharynx: Oropharynx is clear.   Eyes:      Conjunctiva/sclera: Conjunctivae normal.   Cardiovascular:      Rate and Rhythm: Normal rate and regular rhythm.   Pulmonary:      Effort: Pulmonary effort is normal. No respiratory distress.      Breath sounds: Normal breath sounds.   Abdominal:      General: Abdomen is flat. Bowel sounds are normal.      Palpations: Abdomen is soft.   Musculoskeletal:         General: Normal range of motion.      Cervical back: Normal range of motion.   Lymphadenopathy:      Cervical: No cervical adenopathy.   Skin:     General: Skin is warm and dry.   Neurological:      General: No focal deficit present.      Mental Status: He is alert and oriented to person, place, and time. Mental status is at baseline.   Psychiatric:         Mood and Affect: Mood normal.         Behavior: Behavior normal.          Labs:   Lab Results   Component Value Date    WBC 8.12 01/13/2025    HGB 14.7 01/13/2025    HCT 44.0 01/13/2025    MCV 88.0 01/13/2025     (H) 01/13/2025       CMP  Sodium   Date Value Ref Range Status   01/13/2025 140 136 - 145 mmol/L Final     Potassium   Date Value Ref Range Status   01/13/2025 4.2 3.5 - 5.1 mmol/L Final     Chloride   Date Value Ref Range Status   01/13/2025 110 (H) 98 - 107 " mmol/L Final     CO2   Date Value Ref Range Status   01/13/2025 22 (L) 23 - 31 mmol/L Final     Blood Urea Nitrogen   Date Value Ref Range Status   01/13/2025 12.3 8.4 - 25.7 mg/dL Final     Creatinine   Date Value Ref Range Status   01/13/2025 0.80 0.72 - 1.25 mg/dL Final     Calcium   Date Value Ref Range Status   01/13/2025 9.2 8.8 - 10.0 mg/dL Final     Albumin   Date Value Ref Range Status   01/13/2025 4.2 3.4 - 4.8 g/dL Final     Bilirubin Total   Date Value Ref Range Status   01/13/2025 0.6 <=1.5 mg/dL Final     ALP   Date Value Ref Range Status   01/13/2025 121 40 - 150 unit/L Final     AST   Date Value Ref Range Status   01/13/2025 29 5 - 34 unit/L Final     ALT   Date Value Ref Range Status   01/13/2025 28 0 - 55 unit/L Final     eGFR   Date Value Ref Range Status   01/13/2025 >60 mL/min/1.73/m2 Final     Lab Results   Component Value Date    TSH 2.391 01/13/2025     Hep C Ab Interp   Date Value Ref Range Status   01/13/2025 Nonreactive Nonreactive Final     Syphilis Antibody   Date Value Ref Range Status   01/13/2025 Nonreactive Nonreactive, Equivocal Final     Cholesterol Total   Date Value Ref Range Status   01/13/2025 154 <=200 mg/dL Final     HDL Cholesterol   Date Value Ref Range Status   01/13/2025 57 35 - 60 mg/dL Final     Triglyceride   Date Value Ref Range Status   01/13/2025 75 34 - 140 mg/dL Final     Cholesterol/HDL Ratio   Date Value Ref Range Status   01/13/2025 3 0 - 5 Final     Very Low Density Lipoprotein   Date Value Ref Range Status   01/13/2025 15  Final     LDL Cholesterol   Date Value Ref Range Status   01/13/2025 82.00 50.00 - 140.00 mg/dL Final     Vitamin D   Date Value Ref Range Status   01/13/2025 33 30 - 80 ng/mL Final     Results for orders placed or performed in visit on 01/04/24   CD4 Lymphocytes   Result Value Ref Range    Patient Age 63     WBC Absolute 7,900 4,500 - 11,500 /mm3    Lymph Percent 48 28 - 48 %    Lymph Absolute 3,792 1,260 - 5,520 x10(3)/mcL    CD4 % 40.2 %     CD4 Absolute 1,524 (H) 589 - 1,505 unit/L    T Cell Interp       Normal total absolute lymphocyte count and mildly increased absolute CD4 lymphocyte count.    Blake Kaufman M.D.    Narrative    This test was developed and its performance characteristics determined by [facility]. It has not been cleared or approved by the US Food and Drug Administration. The FDA does not require this test to go through premarket FDA review. This test is used for clinical purposes. It should not be regarded as investigational or for research. This laboratory is certified under the Clinical Laboratory Improvement Amendments (CLIA) as qualified to perform high complexity clinical laboratory testing.     Results for orders placed or performed in visit on 09/12/24   HIV-1 RNA, Quantitative, PCR with Reflex to Genotype   Result Value Ref Range    HIV-1 RNA Detect/Quant, P 76 (A) Undetected copies/mL     Results for orders placed or performed in visit on 01/04/24   Quantiferon Gold TB   Result Value Ref Range    QuantiFERON-Tb Gold Plus Result Negative Negative    TB1 Ag minus Nil Result 0.00 IU/mL    TB2 Ag minus Nil Result -0.01 IU/mL    Mitogen minus Nil Result >10.00 IU/mL    Nil Result 0.03 IU/mL     No results found for this or any previous visit.  Results for orders placed or performed in visit on 01/04/24   Urinalysis   Result Value Ref Range    Color, UA Light-Yellow Yellow, Light-Yellow, Dark Yellow, Yelena, Straw    Appearance, UA Clear Clear    Specific Gravity, UA 1.015 1.005 - 1.030    pH, UA 5.0 5.0 - 8.5    Protein, UA Negative Negative    Glucose, UA Normal Negative, Normal    Ketones, UA Negative Negative    Blood, UA 1+ (A) Negative    Bilirubin, UA Negative Negative    Urobilinogen, UA Normal 0.2, 1.0, Normal    Nitrites, UA Negative Negative    Leukocyte Esterase,  (A) Negative    WBC, UA 21-50 (A) None Seen, 0-2, 3-5, 0-5 /HPF    Bacteria, UA Few (A) None Seen /HPF    Squamous  Epithelial Cells, UA None Seen None Seen /HPF    Mucous, UA Trace (A) None Seen /LPF    Hyaline Casts, UA None Seen None Seen /lpf    RBC, UA 0-5 None Seen, 0-2, 3-5, 0-5 /HPF       Imaging: Reviewed most recent relevant imaging studies available, notable results highlighted in this note    Medications:     Current Outpatient Medications   Medication Instructions    atorvastatin (LIPITOR) 20 mg, Oral, Daily    nbioeqtky-dwgokszr-bnhbkpb ala (BIKTARVY) -25 mg (25 kg or greater) 1 tablet, Oral, Daily       Assessment:       Problem List Items Addressed This Visit       HIV disease - Primary    Relevant Medications    llbimbhja-zkqfqilm-mwugtrc ala (BIKTARVY) -25 mg (25 kg or greater)    Other Relevant Orders    Quantiferon Gold TB    Hepatitis C Antibody (Completed)    SYPHILIS ANTIBODY (WITH REFLEX RPR) (Completed)    Chlamydia/GC, PCR (Completed)    Comprehensive Metabolic Panel (Completed)    CBC Auto Differential (Completed)    CD4 Lymphocytes    HIV-1 RNA, Quantitative, PCR with Reflex to Genotype    Hepatitis A antibody, IgG (Completed)    Hepatitis B Surface Ab, Qualitative (Completed)    Urinalysis, Reflex to Urine Culture (Completed)    Ambulatory referral/consult to Ophthalmology    Pure hypercholesterolemia    Relevant Medications    atorvastatin (LIPITOR) 20 MG tablet    Other Relevant Orders    Lipid Panel (Completed)     Other Visit Diagnoses       Abnormal finding of blood chemistry, unspecified        Relevant Orders    Hemoglobin A1C (Completed)    Vitamin D deficiency, unspecified        Relevant Orders    Vitamin D (Completed)    Other general symptoms and signs        Relevant Orders    TSH (Completed)    Need for vaccination        Relevant Medications    influenza (adjuvanted) (Fluad) 45 mcg/0.5 mL IM vaccine (> or = 66 yo) 0.5 mL (Completed)    Tdap (BOOSTRIX) vaccine injection 0.5 mL (Completed)               Plan:      HIV infection  -     yceilkodo-yrueorjy-tseiqln ala (BIKTARVY)  -25 mg (25 kg or greater); Take 1 tablet by mouth once daily.  Dispense: 30 tablet; Refill: 4  -     Quantiferon Gold TB; Future; Expected date: 01/13/2025  -     Hepatitis C Antibody; Future; Expected date: 01/13/2025  -     SYPHILIS ANTIBODY (WITH REFLEX RPR); Future; Expected date: 01/13/2025  -     Chlamydia/GC, PCR  -     Comprehensive Metabolic Panel; Future; Expected date: 01/13/2025  -     CBC Auto Differential; Future; Expected date: 01/13/2025  -     CD4 Lymphocytes; Future; Expected date: 01/13/2025  -     HIV-1 RNA, Quantitative, PCR with Reflex to Genotype; Future; Expected date: 01/13/2025  -     Hepatitis A antibody, IgG; Future; Expected date: 01/13/2025  -     Hepatitis B Surface Ab, Qualitative; Future; Expected date: 01/13/2025  -     Urinalysis, Reflex to Urine Culture  -     Ambulatory referral/consult to Ophthalmology; Future; Expected date: 01/27/2025  Diagnosed: 5/1/2012  HLA- negative  MATTHEW: none  Baseline CD4 307, HIV VL [id number]  ART history: Atripla, Stribild, Genvoya, Biktarvy    Adherence and sexual health counseling done.  Use condoms for all sexual encounters.  Blood precautions.   Continue Biktarvy 1 po daily.   Labs today.  RTC 4 months with Ashley, in clinic.     HIV Wellness:  Anal pap: 2019 NIL, no history of anal intercourse.   Oral CT/GC: 2019 neg  Anal CT/GC: 2019 neg  Urine CT/GC: 12/22 Neg, 1/24  neg, 1/25  RPR: 12/22 NR, 1/24 NR, 1/25  Ophth:  4/21  DEXA: 7/23 WNL  Colonoscopy: 7/2014 at [facility], 6/9/21 Dr. Lee, repeat in 5 yrs    Pure hypercholesterolemia  -     atorvastatin (LIPITOR) 20 MG tablet; Take 1 tablet (20 mg total) by mouth once daily.  Dispense: 90 tablet; Refill: 1  -     Lipid Panel; Future; Expected date: 01/13/2025  Decrease intake of fried & greasy foods.    Increase intake of Omega 3 rich foods in diet such as fatty fish, nuts, avocado, etc.  Avoid trans fat in diet, commonly found in packaged foods such as snacks/cakes/cookies.  Increase  fiber intake.   Increase exercise to at least 30 minutes of moderate activity 3-5 days per week.  Continue atorvastatin as prescribed.    Abnormal finding of blood chemistry, unspecified  -     Hemoglobin A1C; Future; Expected date: 01/13/2025    Vitamin D deficiency, unspecified  -     Vitamin D; Future; Expected date: 01/13/2025    Other general symptoms and signs  -     TSH; Future; Expected date: 01/13/2025    Need for vaccination  -     influenza (adjuvanted) (Fluad) 45 mcg/0.5 mL IM vaccine (> or = 66 yo) 0.5 mL  -     Tdap (BOOSTRIX) vaccine injection 0.5 mL  Flu & TDAP vaccinations today.

## 2025-01-14 LAB
AGE: 66
CD3+CD4+ CELLS # SPEC: 1366 UNIT/L (ref 589–1505)
CD3+CD4+ CELLS NFR BLD: 31.1 %
HIV1 RNA # PLAS NAA DL=20: NORMAL COPIES/ML
LYMPHOCYTES # BLD AUTO: 4392.92 X10(3)/MCL (ref 1260–5520)
LYMPHOCYTES NFR LN MANUAL: 54.1 % (ref 28–48)
LYMPHOMA - T-CELL MARKERS SPEC-IMP: ABNORMAL
WBC # BLD AUTO: 8120 /MM3 (ref 4500–11500)

## 2025-01-15 LAB
GAMMA INTERFERON BACKGROUND BLD IA-ACNC: 0.04 IU/ML
M TB IFN-G BLD-IMP: NEGATIVE
M TB IFN-G CD4+ BCKGRND COR BLD-ACNC: 0 IU/ML
M TB IFN-G CD4+CD8+ BCKGRND COR BLD-ACNC: 0 IU/ML
MITOGEN IGNF BCKGRD COR BLD-ACNC: 9.96 IU/ML

## 2025-02-24 ENCOUNTER — TELEPHONE (OUTPATIENT)
Dept: INFECTIOUS DISEASES | Facility: CLINIC | Age: 65
End: 2025-02-24
Payer: MEDICARE

## 2025-02-24 NOTE — TELEPHONE ENCOUNTER
"Phoned patient. States received a call with a message "from healthcare provider" and wondered if we had called. Sister got a new land line and he is trying to get used to it. Reassured that no call was made to his number and to continue to take his Biktarvy. Voiced understanding and appreciates call.  "

## 2025-02-24 NOTE — TELEPHONE ENCOUNTER
----- Message from Fozia sent at 2/24/2025 10:46 AM CST -----  Patient of [facility] called requesting to speak with nurse.Please call   10:46Thanks,

## 2025-05-14 ENCOUNTER — LAB VISIT (OUTPATIENT)
Dept: LAB | Facility: HOSPITAL | Age: 65
End: 2025-05-14
Attending: NURSE PRACTITIONER
Payer: MEDICARE

## 2025-05-14 ENCOUNTER — OFFICE VISIT (OUTPATIENT)
Dept: INFECTIOUS DISEASES | Facility: CLINIC | Age: 65
End: 2025-05-14
Payer: MEDICARE

## 2025-05-14 VITALS
HEIGHT: 67 IN | WEIGHT: 151 LBS | SYSTOLIC BLOOD PRESSURE: 137 MMHG | DIASTOLIC BLOOD PRESSURE: 70 MMHG | BODY MASS INDEX: 23.7 KG/M2 | TEMPERATURE: 98 F | HEART RATE: 99 BPM | RESPIRATION RATE: 10 BRPM

## 2025-05-14 DIAGNOSIS — R53.83 FATIGUE, UNSPECIFIED TYPE: ICD-10-CM

## 2025-05-14 DIAGNOSIS — Z12.5 PROSTATE CANCER SCREENING: ICD-10-CM

## 2025-05-14 DIAGNOSIS — Z21 ASYMPTOMATIC HIV INFECTION, WITH NO HISTORY OF HIV-RELATED ILLNESS: Primary | ICD-10-CM

## 2025-05-14 DIAGNOSIS — E78.00 PURE HYPERCHOLESTEROLEMIA: ICD-10-CM

## 2025-05-14 DIAGNOSIS — Z21 ASYMPTOMATIC HIV INFECTION, WITH NO HISTORY OF HIV-RELATED ILLNESS: ICD-10-CM

## 2025-05-14 LAB
ALBUMIN SERPL-MCNC: 3.9 G/DL (ref 3.4–4.8)
ALBUMIN/GLOB SERPL: 1.1 RATIO (ref 1.1–2)
ALP SERPL-CCNC: 107 UNIT/L (ref 40–150)
ALT SERPL-CCNC: 21 UNIT/L (ref 0–55)
ANION GAP SERPL CALC-SCNC: 8 MEQ/L
AST SERPL-CCNC: 20 UNIT/L (ref 11–45)
BASOPHILS # BLD AUTO: 0.05 X10(3)/MCL
BASOPHILS NFR BLD AUTO: 0.5 %
BILIRUB SERPL-MCNC: 0.5 MG/DL
BUN SERPL-MCNC: 12.6 MG/DL (ref 8.4–25.7)
CALCIUM SERPL-MCNC: 9.3 MG/DL (ref 8.8–10)
CHLORIDE SERPL-SCNC: 105 MMOL/L (ref 98–107)
CO2 SERPL-SCNC: 27 MMOL/L (ref 23–31)
CREAT SERPL-MCNC: 0.86 MG/DL (ref 0.72–1.25)
CREAT/UREA NIT SERPL: 15
EOSINOPHIL # BLD AUTO: 0.19 X10(3)/MCL (ref 0–0.9)
EOSINOPHIL NFR BLD AUTO: 1.9 %
ERYTHROCYTE [DISTWIDTH] IN BLOOD BY AUTOMATED COUNT: 14.4 % (ref 11.5–17)
GFR SERPLBLD CREATININE-BSD FMLA CKD-EPI: >60 ML/MIN/1.73/M2
GLOBULIN SER-MCNC: 3.7 GM/DL (ref 2.4–3.5)
GLUCOSE SERPL-MCNC: 97 MG/DL (ref 82–115)
HCT VFR BLD AUTO: 45.5 % (ref 42–52)
HGB BLD-MCNC: 15 G/DL (ref 14–18)
IMM GRANULOCYTES # BLD AUTO: 0.01 X10(3)/MCL (ref 0–0.04)
IMM GRANULOCYTES NFR BLD AUTO: 0.1 %
LYMPHOCYTES # BLD AUTO: 3.85 X10(3)/MCL (ref 0.6–4.6)
LYMPHOCYTES NFR BLD AUTO: 37.9 %
MCH RBC QN AUTO: 30.1 PG (ref 27–31)
MCHC RBC AUTO-ENTMCNC: 33 G/DL (ref 33–36)
MCV RBC AUTO: 91.4 FL (ref 80–94)
MONOCYTES # BLD AUTO: 0.68 X10(3)/MCL (ref 0.1–1.3)
MONOCYTES NFR BLD AUTO: 6.7 %
NEUTROPHILS # BLD AUTO: 5.37 X10(3)/MCL (ref 2.1–9.2)
NEUTROPHILS NFR BLD AUTO: 52.9 %
NRBC BLD AUTO-RTO: 0 %
PLATELET # BLD AUTO: 484 X10(3)/MCL (ref 130–400)
PMV BLD AUTO: 9.1 FL (ref 7.4–10.4)
POTASSIUM SERPL-SCNC: 4 MMOL/L (ref 3.5–5.1)
PROT SERPL-MCNC: 7.6 GM/DL (ref 5.8–7.6)
PSA SERPL-MCNC: 1.75 NG/ML
RBC # BLD AUTO: 4.98 X10(6)/MCL (ref 4.7–6.1)
SODIUM SERPL-SCNC: 140 MMOL/L (ref 136–145)
TESTOST SERPL-MCNC: 673.17 NG/DL (ref 220.91–715.81)
WBC # BLD AUTO: 10.15 X10(3)/MCL (ref 4.5–11.5)

## 2025-05-14 PROCEDURE — 99214 OFFICE O/P EST MOD 30 MIN: CPT | Mod: S$PBB,,, | Performed by: NURSE PRACTITIONER

## 2025-05-14 PROCEDURE — 99213 OFFICE O/P EST LOW 20 MIN: CPT | Mod: PBBFAC | Performed by: NURSE PRACTITIONER

## 2025-05-14 PROCEDURE — 80053 COMPREHEN METABOLIC PANEL: CPT

## 2025-05-14 PROCEDURE — 84402 ASSAY OF FREE TESTOSTERONE: CPT

## 2025-05-14 PROCEDURE — 84153 ASSAY OF PSA TOTAL: CPT

## 2025-05-14 PROCEDURE — 84403 ASSAY OF TOTAL TESTOSTERONE: CPT

## 2025-05-14 PROCEDURE — 87536 HIV-1 QUANT&REVRSE TRNSCRPJ: CPT

## 2025-05-14 PROCEDURE — 85025 COMPLETE CBC W/AUTO DIFF WBC: CPT

## 2025-05-14 PROCEDURE — 36415 COLL VENOUS BLD VENIPUNCTURE: CPT

## 2025-05-14 RX ORDER — BICTEGRAVIR SODIUM, EMTRICITABINE, AND TENOFOVIR ALAFENAMIDE FUMARATE 50; 200; 25 MG/1; MG/1; MG/1
1 TABLET ORAL DAILY
Qty: 30 TABLET | Refills: 4 | Status: SHIPPED | OUTPATIENT
Start: 2025-05-14

## 2025-05-14 NOTE — PROGRESS NOTES
"Patient ID: Chad Richardson 65 y.o.     Chief Complaint:   Chief Complaint   Patient presents with    Followup HIV     States having some fatigue        HPI:    5/14/25  Chad is a 66 yo WM here today for HIV f/u visit.  He is virally suppressed on Biktarvy and is tolerating it well. Labs 1/25 VL UD, Cd4 1366.  He remains in care with Dr. Kim for PCP wellness.  PSA ordered as requested. He tells me that he has been feeling fatigued. Acknowledges that he is not drinking any water. Will increase. Thyroid levels checked last labs, normal. Will assess testosterone levels as well. Voiced appreciation. All questions answered & concerns addressed.    1/13/25  Chad is a 63 yo WM presenting today for HIV f/u visit.  He takes Biktarvy daily but does admit that he was "slipping" around time of last visit. He has improved adherence since. Labs 9/24 VL 76, Cd4 1252.  He continues with atorvastatin daily, tolerates well. Will check lipid panel today. Appreciates flu & TDAP vaccines as recommended today. All questions answered & concerns addressed.     9/12/24  Chad is a 63 yo WM here today for HIV f/u visit.  He is tolerating Biktarvy well but has noted an increase in fatigue over the past couple of months. May be associated with summer heat though, will continue Biktarvy and re-evaluate at next visit. Voiced appreciation. He tells me that he had about 1.5 week interruption in atorvastatin due to pharmacy not filling on time. Has since resumed & is tolerating well. Cholesterol well controlled. Labs 5/24 VL UD, 1/24 CD4 1524.  He has no questions or concerns today.            Past Medical History:   Diagnosis Date    Below knee amputation     HIV infection     Hyperlipidemia         Past Surgical History:   Procedure Laterality Date    BELOW KNEE AMPUTATION OF LOWER EXTREMITY      COLONOSCOPY  06/09/2021    Dr Felix Lee    SPLENECTOMY, TOTAL          Social History     Socioeconomic History    Marital status: " Single   Tobacco Use    Smoking status: Some Days     Current packs/day: 0.25     Average packs/day: 1 pack/day for 55.4 years (54.5 ttl pk-yrs)     Types: Cigarettes     Start date:     Smokeless tobacco: Never   Substance and Sexual Activity    Alcohol use: Yes     Alcohol/week: 7.0 standard drinks of alcohol     Types: 7 Cans of beer per week     Comment: daily    Drug use: Not Currently    Sexual activity: Not Currently     Partners: Female     Birth control/protection: Condom     Comment: States s/o         Family History   Problem Relation Name Age of Onset    Diabetes Mother      Diabetes Sister      Diabetes Brother          Review of patient's allergies indicates:  No Known Allergies     Immunization History   Administered Date(s) Administered    Hepatitis A, Adult 2012    Hepatitis B, Adult 2014    Influenza 10/11/2013    Influenza (FLUAD) - Quadrivalent - Adjuvanted - PF *Preferred* (65+) 2024    Influenza - Quadrivalent 10/12/2016, 10/30/2017, 2019, 2020, 12/15/2021    Influenza - Quadrivalent - PF (6-35 months) 10/30/2017, 2018    Influenza - Quadrivalent - PF *Preferred* (6 months and older) 10/30/2017, 2019, 2022    Influenza - Trivalent - Fluad - Adjuvanted - PF (65 years and older 2025    Influenza - Trivalent - Fluarix, Flulaval, Fluzone, Afluria - PF 10/11/2013, 10/16/2014, 10/27/2015, 10/30/2017    MMR 2015    Meningococcal Conjugate (MCV4P) 2018, 2018    Pneumococcal Conjugate - 13 Valent 2014, 01/15/2020    Pneumococcal Conjugate - 20 Valent 2023    Pneumococcal Polysaccharide - 23 Valent 2012, 2018    Tdap 2014, 2025    Zoster Recombinant 2023, 2024        Review of Systems   Constitutional: Negative.    HENT: Negative.     Eyes: Negative.    Respiratory: Negative.     Cardiovascular: Negative.    Gastrointestinal: Negative.    Genitourinary: Negative.   "  Musculoskeletal: Negative.    Skin: Negative.    Neurological: Negative.    Endo/Heme/Allergies: Negative.    Psychiatric/Behavioral: Negative.     All other systems reviewed and are negative.         Objective:      /70 (BP Location: Right arm, Patient Position: Sitting)   Pulse 99   Temp 98.2 °F (36.8 °C) (Oral)   Resp 10   Ht 5' 7" (1.702 m)   Wt 68.5 kg (151 lb 0.2 oz)   BMI 23.65 kg/m²      Physical Exam  Vitals reviewed.   Constitutional:       General: He is not in acute distress.     Appearance: Normal appearance. He is not toxic-appearing.   HENT:      Mouth/Throat:      Mouth: Mucous membranes are moist.      Pharynx: Oropharynx is clear.   Eyes:      Conjunctiva/sclera: Conjunctivae normal.   Cardiovascular:      Rate and Rhythm: Normal rate and regular rhythm.   Pulmonary:      Effort: Pulmonary effort is normal. No respiratory distress.      Breath sounds: Normal breath sounds.   Abdominal:      General: Abdomen is flat. Bowel sounds are normal.      Palpations: Abdomen is soft.   Musculoskeletal:         General: Normal range of motion.      Cervical back: Normal range of motion.   Lymphadenopathy:      Cervical: No cervical adenopathy.   Skin:     General: Skin is warm and dry.   Neurological:      General: No focal deficit present.      Mental Status: He is alert and oriented to person, place, and time. Mental status is at baseline.   Psychiatric:         Mood and Affect: Mood normal.         Behavior: Behavior normal.          Labs:   Lab Results   Component Value Date    WBC 10.15 05/14/2025    HGB 15.0 05/14/2025    HCT 45.5 05/14/2025    MCV 91.4 05/14/2025     (H) 05/14/2025       CMP  Sodium   Date Value Ref Range Status   05/14/2025 140 136 - 145 mmol/L Final     Potassium   Date Value Ref Range Status   05/14/2025 4.0 3.5 - 5.1 mmol/L Final     Chloride   Date Value Ref Range Status   05/14/2025 105 98 - 107 mmol/L Final     CO2   Date Value Ref Range Status   05/14/2025 " 27 23 - 31 mmol/L Final     Glucose   Date Value Ref Range Status   05/14/2025 97 82 - 115 mg/dL Final     Blood Urea Nitrogen   Date Value Ref Range Status   05/14/2025 12.6 8.4 - 25.7 mg/dL Final     Creatinine   Date Value Ref Range Status   05/14/2025 0.86 0.72 - 1.25 mg/dL Final     Calcium   Date Value Ref Range Status   05/14/2025 9.3 8.8 - 10.0 mg/dL Final     Protein Total   Date Value Ref Range Status   05/14/2025 7.6 5.8 - 7.6 gm/dL Final     Albumin   Date Value Ref Range Status   05/14/2025 3.9 3.4 - 4.8 g/dL Final     Bilirubin Total   Date Value Ref Range Status   05/14/2025 0.5 <=1.5 mg/dL Final     ALP   Date Value Ref Range Status   05/14/2025 107 40 - 150 unit/L Final     AST   Date Value Ref Range Status   05/14/2025 20 11 - 45 unit/L Final     ALT   Date Value Ref Range Status   05/14/2025 21 0 - 55 unit/L Final     eGFR   Date Value Ref Range Status   05/14/2025 >60 mL/min/1.73/m2 Final     Comment:     Estimated GFR calculated using the CKD-EPI creatinine (2021) equation.     Lab Results   Component Value Date    TSH 2.391 01/13/2025     Hep C Ab Interp   Date Value Ref Range Status   01/13/2025 Nonreactive Nonreactive Final     Syphilis Antibody   Date Value Ref Range Status   01/13/2025 Nonreactive Nonreactive, Equivocal Final     Cholesterol Total   Date Value Ref Range Status   01/13/2025 154 <=200 mg/dL Final     HDL Cholesterol   Date Value Ref Range Status   01/13/2025 57 35 - 60 mg/dL Final     Triglyceride   Date Value Ref Range Status   01/13/2025 75 34 - 140 mg/dL Final     Cholesterol/HDL Ratio   Date Value Ref Range Status   01/13/2025 3 0 - 5 Final     Very Low Density Lipoprotein   Date Value Ref Range Status   01/13/2025 15  Final     LDL Cholesterol   Date Value Ref Range Status   01/13/2025 82.00 50.00 - 140.00 mg/dL Final     Vitamin D   Date Value Ref Range Status   01/13/2025 33 30 - 80 ng/mL Final     Results for orders placed or performed in visit on 01/13/25   CD4  Lymphocytes   Result Value Ref Range    Patient Age 64     WBC Absolute 8,120 4,500 - 11,500 /mm3    Lymph Percent 54.1 (H) 28 - 48 %    Lymph Absolute 4,392.92 1,260 - 5,520 x10(3)/mcL    CD4 % 31.1 %    CD4 Absolute 1,366 589 - 1,505 unit/L    T Cell Interp       Normal absolute lymphocyte count with normal absolute CD4+ lymphocyte count.    Rashi Escobar M.D.     Narrative    This test was developed and its performance characteristics determined by [facility]. It has not been cleared or approved by the US Food and Drug Administration. The FDA does not require this test to go through premarket FDA review. This test is used for clinical purposes. It should not be regarded as investigational or for research. This laboratory is certified under the Clinical Laboratory Improvement Amendments (CLIA) as qualified to perform high complexity clinical laboratory testing.     Results for orders placed or performed in visit on 01/13/25   HIV-1 RNA, Quantitative, PCR with Reflex to Genotype   Result Value Ref Range    HIV-1 RNA Detect/Quant, P Undetected Undetected copies/mL     Results for orders placed or performed in visit on 01/13/25   Quantiferon Gold TB   Result Value Ref Range    QuantiFERON-Tb Gold Plus Result Negative Negative    TB1 Ag minus Nil Result 0.00 IU/mL    TB2 Ag minus Nil Result 0.00 IU/mL    Mitogen minus Nil Result 9.96 IU/mL    Nil Result 0.04 IU/mL     No results found for this or any previous visit.  Results for orders placed or performed in visit on 01/04/24   Urinalysis   Result Value Ref Range    Color, UA Light-Yellow Yellow, Light-Yellow, Dark Yellow, Yelena, Straw    Appearance, UA Clear Clear    Specific Gravity, UA 1.015 1.005 - 1.030    pH, UA 5.0 5.0 - 8.5    Protein, UA Negative Negative    Glucose, UA Normal Negative, Normal    Ketones, UA Negative Negative    Blood, UA 1+ (A) Negative    Bilirubin, UA Negative Negative    Urobilinogen, UA Normal 0.2, 1.0,  Normal    Nitrites, UA Negative Negative    Leukocyte Esterase,  (A) Negative    WBC, UA 21-50 (A) None Seen, 0-2, 3-5, 0-5 /HPF    Bacteria, UA Few (A) None Seen /HPF    Squamous Epithelial Cells, UA None Seen None Seen /HPF    Mucous, UA Trace (A) None Seen /LPF    Hyaline Casts, UA None Seen None Seen /lpf    RBC, UA 0-5 None Seen, 0-2, 3-5, 0-5 /HPF       Imaging: Reviewed most recent relevant imaging studies available, notable results highlighted in this note    Medications:     Current Outpatient Medications   Medication Instructions    atorvastatin (LIPITOR) 20 mg, Oral, Daily    njpozzrak-chrieigc-bvlfytq ala (BIKTARVY) -25 mg (25 kg or greater) 1 tablet, Oral, Daily       Assessment:       Problem List Items Addressed This Visit       Pure hypercholesterolemia     Other Visit Diagnoses         Asymptomatic HIV infection, with no history of HIV-related illness    -  Primary    Relevant Medications    eyimafrul-elbzkfba-nxvbgbr ala (BIKTARVY) -25 mg (25 kg or greater)    Other Relevant Orders    HIV-1 RNA, Quantitative, PCR with Reflex to Genotype    Comprehensive Metabolic Panel (Completed)      Fatigue, unspecified type        Relevant Orders    Testosterone, Free Adult Male    Testosterone,Total (Completed)    CBC Auto Differential (Completed)      Prostate cancer screening        Relevant Orders    PSA, Screening (Completed)               Plan:      Asymptomatic HIV infection, with no history of HIV-related illness  -     cjkyvqwaz-zwlsithh-bofbwgp ala (BIKTARVY) -25 mg (25 kg or greater); Take 1 tablet by mouth once daily.  Dispense: 30 tablet; Refill: 4  -     HIV-1 RNA, Quantitative, PCR with Reflex to Genotype; Future; Expected date: 05/14/2025  -     Comprehensive Metabolic Panel; Future; Expected date: 05/14/2025  Diagnosed: 5/1/2012  HLA- negative  MATTHEW: none  Baseline CD4 307, HIV VL [id number]  ART history: Atripla, Stribild, Genvoya, Biktarvy     Adherence and sexual  health counseling done.  Use condoms for all sexual encounters.  Blood precautions.   Continue Biktarvy 1 po daily.   Labs today.  RTC 4 months with Ashley, in clinic.     HIV Wellness:  Anal pap: 2019 NIL, no history of anal intercourse.   Oral CT/GC: 2019 neg  Anal CT/GC: 2019 neg  Urine CT/GC: 12/22 Neg, 1/24  neg, 1/25  RPR: 12/22 NR, 1/24 NR, 1/25  Ophth:  4/21  DEXA: 7/23 WNL  Colonoscopy: 7/2014 at [facility], 6/9/21 Dr. Lee, repeat in 5 yrs    Pure hypercholesterolemia  Controlled.  Decrease intake of fried & greasy foods.    Increase intake of Omega 3 rich foods in diet such as fatty fish, nuts, avocado, etc.  Avoid trans fat in diet, commonly found in packaged foods such as snacks/cakes/cookies.  Increase fiber intake.   Increase exercise to at least 30 minutes of moderate activity 3-5 days per week.  Continue atorvastatin as prescribed.    Fatigue, unspecified type  -     Testosterone, Free Adult Male; Future; Expected date: 05/14/2025  -     Testosterone,Total; Future; Expected date: 05/14/2025  -     CBC Auto Differential; Future; Expected date: 05/14/2025  Increase water intake.   Check CBC & testosterone levels today.     Prostate cancer screening  -     PSA, Screening; Future; Expected date: 05/14/2025  Screening PSA as ordered.

## 2025-05-16 LAB
HIV1 RNA # PLAS NAA DL=20: NORMAL COPIES/ML
TESTOST FREE SERPL-MCNC: 74 PG/ML

## 2025-06-10 ENCOUNTER — OFFICE VISIT (OUTPATIENT)
Dept: OPHTHALMOLOGY | Facility: CLINIC | Age: 65
End: 2025-06-10
Payer: MEDICARE

## 2025-06-10 VITALS — BODY MASS INDEX: 24.75 KG/M2 | WEIGHT: 154 LBS | HEIGHT: 66 IN

## 2025-06-10 DIAGNOSIS — H11.153 PINGUECULA OF BOTH EYES: ICD-10-CM

## 2025-06-10 DIAGNOSIS — H52.13 MYOPIA WITH ASTIGMATISM AND PRESBYOPIA, BILATERAL: ICD-10-CM

## 2025-06-10 DIAGNOSIS — H11.003 PTERYGIUM OF BOTH EYES: ICD-10-CM

## 2025-06-10 DIAGNOSIS — H31.002 CHORIORETINAL SCAR OF LEFT EYE: ICD-10-CM

## 2025-06-10 DIAGNOSIS — H52.4 MYOPIA WITH ASTIGMATISM AND PRESBYOPIA, BILATERAL: ICD-10-CM

## 2025-06-10 DIAGNOSIS — D31.32 CHOROIDAL NEVUS OF LEFT EYE: ICD-10-CM

## 2025-06-10 DIAGNOSIS — B20 HIV DISEASE: Primary | ICD-10-CM

## 2025-06-10 DIAGNOSIS — H52.203 MYOPIA WITH ASTIGMATISM AND PRESBYOPIA, BILATERAL: ICD-10-CM

## 2025-06-10 PROCEDURE — 92134 CPTRZ OPH DX IMG PST SGM RTA: CPT | Mod: PBBFAC,PN | Performed by: OPHTHALMOLOGY

## 2025-06-10 PROCEDURE — 99212 OFFICE O/P EST SF 10 MIN: CPT | Mod: PBBFAC,PN

## 2025-06-10 PROCEDURE — 92134 CPTRZ OPH DX IMG PST SGM RTA: CPT | Mod: PBBFAC,PN

## 2025-06-10 NOTE — PROGRESS NOTES
HPI    Here for Retinal Examination R/T HIV.  - No complaints at present time.   - Glasses are SV for distance, takes them off to read and uses a   magnifying glass if needed.   Last edited by Margarita Fatima LPN on 6/10/2025  9:42 AM.        TESTING    Fundus photos  - 06/10/2025   OD: media clear, retina without lesions  OS: media clear, retina without lesions, CRS at 2 o'clock midperiphery    OCT mac (06/10/2025)  OD: , intact foveal contour, no IRF/SRF  OS: , intact foveal contour, no IRF/SRF      ASSESSMENT / PLAN:    1. HIV without ophthalmologic manifestations  - Last CD4 count (1/2025): 1336  - Last viral load (5/2025):  undetectable  - Currently taking Biktarvy for HAART  - No signs of retinopathy on exam  - Encouraged follow up with PCP regularly  - Monitor with annual DFE given CD4 > 400 cells/µL (next due 6/2026)    2. Chorioretinal scar, left eye  - Appears chronic  - Annual fundus photos    3. Myopia with astigmatism and presbyopia, both eyes  - BCVA 20/20  - Prescription dispensed    4. Pterygium, both eyes  5. Pinguecula, both eyes  - Patient does not have an associated decrease in vision from astigmatism  - Lesion does not appear to be highly vascularized and injected  - No associated SPK or delle  - No edwin line noted on anterior exam, lesion is not in the visual axis  - Recommend UV blocking sunglasses or goggles  - Start lubrication with OTC artificial tears 4-8 times per day  - Can consider ketorolac QID or FML QID if needed in the future    RTC 1 year DFE, OCT mac, fundus photos

## 2025-06-12 PROBLEM — H31.002 CHORIORETINAL SCAR OF LEFT EYE: Status: ACTIVE | Noted: 2025-06-12

## 2025-06-12 PROBLEM — H11.003 PTERYGIUM OF BOTH EYES: Status: ACTIVE | Noted: 2025-06-12
